# Patient Record
Sex: MALE | Race: OTHER | NOT HISPANIC OR LATINO | URBAN - METROPOLITAN AREA
[De-identification: names, ages, dates, MRNs, and addresses within clinical notes are randomized per-mention and may not be internally consistent; named-entity substitution may affect disease eponyms.]

---

## 2020-12-30 ENCOUNTER — EMERGENCY (EMERGENCY)
Facility: HOSPITAL | Age: 80
LOS: 1 days | Discharge: HOME | End: 2020-12-30
Attending: EMERGENCY MEDICINE
Payer: MEDICARE

## 2020-12-30 VITALS
DIASTOLIC BLOOD PRESSURE: 92 MMHG | SYSTOLIC BLOOD PRESSURE: 164 MMHG | OXYGEN SATURATION: 98 % | RESPIRATION RATE: 17 BRPM | HEART RATE: 62 BPM | TEMPERATURE: 98 F

## 2020-12-30 VITALS
RESPIRATION RATE: 16 BRPM | HEIGHT: 69 IN | TEMPERATURE: 98 F | HEART RATE: 59 BPM | DIASTOLIC BLOOD PRESSURE: 107 MMHG | WEIGHT: 220.46 LBS | OXYGEN SATURATION: 96 % | SYSTOLIC BLOOD PRESSURE: 214 MMHG

## 2020-12-30 DIAGNOSIS — Z98.89 OTHER SPECIFIED POSTPROCEDURAL STATES: Chronic | ICD-10-CM

## 2020-12-30 LAB
ALBUMIN SERPL ELPH-MCNC: 4.4 G/DL — SIGNIFICANT CHANGE UP (ref 3.3–5)
ALP SERPL-CCNC: 57 U/L — SIGNIFICANT CHANGE UP (ref 40–120)
ALT FLD-CCNC: 14 U/L — SIGNIFICANT CHANGE UP (ref 10–45)
ANION GAP SERPL CALC-SCNC: 11 MMOL/L — SIGNIFICANT CHANGE UP (ref 5–17)
APPEARANCE UR: CLEAR — SIGNIFICANT CHANGE UP
AST SERPL-CCNC: 18 U/L — SIGNIFICANT CHANGE UP (ref 10–40)
BACTERIA # UR AUTO: NEGATIVE — SIGNIFICANT CHANGE UP
BASOPHILS # BLD AUTO: 0.06 K/UL — SIGNIFICANT CHANGE UP (ref 0–0.2)
BASOPHILS NFR BLD AUTO: 0.8 % — SIGNIFICANT CHANGE UP (ref 0–2)
BILIRUB SERPL-MCNC: 0.6 MG/DL — SIGNIFICANT CHANGE UP (ref 0.2–1.2)
BILIRUB UR-MCNC: NEGATIVE — SIGNIFICANT CHANGE UP
BUN SERPL-MCNC: 19 MG/DL — SIGNIFICANT CHANGE UP (ref 7–23)
CALCIUM SERPL-MCNC: 9.8 MG/DL — SIGNIFICANT CHANGE UP (ref 8.4–10.5)
CHLORIDE SERPL-SCNC: 103 MMOL/L — SIGNIFICANT CHANGE UP (ref 96–108)
CO2 SERPL-SCNC: 25 MMOL/L — SIGNIFICANT CHANGE UP (ref 22–31)
COLOR SPEC: SIGNIFICANT CHANGE UP
CREAT SERPL-MCNC: 1.08 MG/DL — SIGNIFICANT CHANGE UP (ref 0.5–1.3)
DIFF PNL FLD: NEGATIVE — SIGNIFICANT CHANGE UP
EOSINOPHIL # BLD AUTO: 0.15 K/UL — SIGNIFICANT CHANGE UP (ref 0–0.5)
EOSINOPHIL NFR BLD AUTO: 2.1 % — SIGNIFICANT CHANGE UP (ref 0–6)
EPI CELLS # UR: 0 /HPF — SIGNIFICANT CHANGE UP
GLUCOSE SERPL-MCNC: 110 MG/DL — HIGH (ref 70–99)
GLUCOSE UR QL: NEGATIVE — SIGNIFICANT CHANGE UP
HCT VFR BLD CALC: 44.5 % — SIGNIFICANT CHANGE UP (ref 39–50)
HGB BLD-MCNC: 15.5 G/DL — SIGNIFICANT CHANGE UP (ref 13–17)
HYALINE CASTS # UR AUTO: 2 /LPF — SIGNIFICANT CHANGE UP (ref 0–2)
IMM GRANULOCYTES NFR BLD AUTO: 0.7 % — SIGNIFICANT CHANGE UP (ref 0–1.5)
KETONES UR-MCNC: SIGNIFICANT CHANGE UP
LEUKOCYTE ESTERASE UR-ACNC: NEGATIVE — SIGNIFICANT CHANGE UP
LYMPHOCYTES # BLD AUTO: 1.61 K/UL — SIGNIFICANT CHANGE UP (ref 1–3.3)
LYMPHOCYTES # BLD AUTO: 22.1 % — SIGNIFICANT CHANGE UP (ref 13–44)
MCHC RBC-ENTMCNC: 31 PG — SIGNIFICANT CHANGE UP (ref 27–34)
MCHC RBC-ENTMCNC: 34.8 GM/DL — SIGNIFICANT CHANGE UP (ref 32–36)
MCV RBC AUTO: 89 FL — SIGNIFICANT CHANGE UP (ref 80–100)
MONOCYTES # BLD AUTO: 0.4 K/UL — SIGNIFICANT CHANGE UP (ref 0–0.9)
MONOCYTES NFR BLD AUTO: 5.5 % — SIGNIFICANT CHANGE UP (ref 2–14)
NEUTROPHILS # BLD AUTO: 5.01 K/UL — SIGNIFICANT CHANGE UP (ref 1.8–7.4)
NEUTROPHILS NFR BLD AUTO: 68.8 % — SIGNIFICANT CHANGE UP (ref 43–77)
NITRITE UR-MCNC: NEGATIVE — SIGNIFICANT CHANGE UP
NRBC # BLD: 0 /100 WBCS — SIGNIFICANT CHANGE UP (ref 0–0)
PH UR: 6 — SIGNIFICANT CHANGE UP (ref 5–8)
PLATELET # BLD AUTO: 171 K/UL — SIGNIFICANT CHANGE UP (ref 150–400)
POTASSIUM SERPL-MCNC: 4.3 MMOL/L — SIGNIFICANT CHANGE UP (ref 3.5–5.3)
POTASSIUM SERPL-SCNC: 4.3 MMOL/L — SIGNIFICANT CHANGE UP (ref 3.5–5.3)
PROT SERPL-MCNC: 7.4 G/DL — SIGNIFICANT CHANGE UP (ref 6–8.3)
PROT UR-MCNC: ABNORMAL
RBC # BLD: 5 M/UL — SIGNIFICANT CHANGE UP (ref 4.2–5.8)
RBC # FLD: 12.1 % — SIGNIFICANT CHANGE UP (ref 10.3–14.5)
RBC CASTS # UR COMP ASSIST: 2 /HPF — SIGNIFICANT CHANGE UP (ref 0–4)
SARS-COV-2 RNA SPEC QL NAA+PROBE: SIGNIFICANT CHANGE UP
SODIUM SERPL-SCNC: 139 MMOL/L — SIGNIFICANT CHANGE UP (ref 135–145)
SP GR SPEC: 1.02 — SIGNIFICANT CHANGE UP (ref 1.01–1.02)
UROBILINOGEN FLD QL: NEGATIVE — SIGNIFICANT CHANGE UP
WBC # BLD: 7.28 K/UL — SIGNIFICANT CHANGE UP (ref 3.8–10.5)
WBC # FLD AUTO: 7.28 K/UL — SIGNIFICANT CHANGE UP (ref 3.8–10.5)
WBC UR QL: 2 /HPF — SIGNIFICANT CHANGE UP (ref 0–5)

## 2020-12-30 PROCEDURE — 85025 COMPLETE CBC W/AUTO DIFF WBC: CPT

## 2020-12-30 PROCEDURE — 96375 TX/PRO/DX INJ NEW DRUG ADDON: CPT

## 2020-12-30 PROCEDURE — 74176 CT ABD & PELVIS W/O CONTRAST: CPT | Mod: 26

## 2020-12-30 PROCEDURE — 87086 URINE CULTURE/COLONY COUNT: CPT

## 2020-12-30 PROCEDURE — 93010 ELECTROCARDIOGRAM REPORT: CPT

## 2020-12-30 PROCEDURE — 81001 URINALYSIS AUTO W/SCOPE: CPT

## 2020-12-30 PROCEDURE — 80053 COMPREHEN METABOLIC PANEL: CPT

## 2020-12-30 PROCEDURE — 74176 CT ABD & PELVIS W/O CONTRAST: CPT

## 2020-12-30 PROCEDURE — 96374 THER/PROPH/DIAG INJ IV PUSH: CPT

## 2020-12-30 PROCEDURE — 99285 EMERGENCY DEPT VISIT HI MDM: CPT

## 2020-12-30 PROCEDURE — 99284 EMERGENCY DEPT VISIT MOD MDM: CPT | Mod: 25

## 2020-12-30 PROCEDURE — 87635 SARS-COV-2 COVID-19 AMP PRB: CPT

## 2020-12-30 PROCEDURE — 93005 ELECTROCARDIOGRAM TRACING: CPT

## 2020-12-30 RX ORDER — SODIUM CHLORIDE 9 MG/ML
1000 INJECTION, SOLUTION INTRAVENOUS ONCE
Refills: 0 | Status: COMPLETED | OUTPATIENT
Start: 2020-12-30 | End: 2020-12-30

## 2020-12-30 RX ORDER — KETOROLAC TROMETHAMINE 30 MG/ML
30 SYRINGE (ML) INJECTION ONCE
Refills: 0 | Status: DISCONTINUED | OUTPATIENT
Start: 2020-12-30 | End: 2020-12-30

## 2020-12-30 RX ORDER — MORPHINE SULFATE 50 MG/1
4 CAPSULE, EXTENDED RELEASE ORAL ONCE
Refills: 0 | Status: DISCONTINUED | OUTPATIENT
Start: 2020-12-30 | End: 2020-12-30

## 2020-12-30 RX ADMIN — Medication 30 MILLIGRAM(S): at 16:52

## 2020-12-30 RX ADMIN — Medication 30 MILLIGRAM(S): at 18:01

## 2020-12-30 RX ADMIN — SODIUM CHLORIDE 1000 MILLILITER(S): 9 INJECTION, SOLUTION INTRAVENOUS at 16:52

## 2020-12-30 RX ADMIN — MORPHINE SULFATE 4 MILLIGRAM(S): 50 CAPSULE, EXTENDED RELEASE ORAL at 18:01

## 2020-12-30 RX ADMIN — MORPHINE SULFATE 4 MILLIGRAM(S): 50 CAPSULE, EXTENDED RELEASE ORAL at 16:47

## 2020-12-30 NOTE — ED ADULT NURSE REASSESSMENT NOTE - NS ED NURSE REASSESS COMMENT FT1
Patient reports improvement in pain, as 0/10 currently. Safety measures maintained. Bed in the lowest position. Call bell within reach. No acute distress noted or further complaints at this time. Awaiting CT.

## 2020-12-30 NOTE — ED PROVIDER NOTE - PATIENT PORTAL LINK FT
You can access the FollowMyHealth Patient Portal offered by [facility] by registering at the following website: [email or web address]. By joining [employer]’s FollowMyHealth portal, you will also be able to view your health information using other applications (apps) compatible with our system.

## 2020-12-30 NOTE — ED PROVIDER NOTE - NSFOLLOWUPINSTRUCTIONS_ED_ALL_ED_FT
KIDNEY STONE OVERVIEW  Kidney stones (also called nephrolithiasis or urolithiasis) are common, affecting 19 percent of men and 9 percent of women by age 70 years. Fortunately, treatment is available to effectively manage most stones. In addition, you can take steps to prevent kidney stones from recurring.    HOW KIDNEY STONES DEVELOP  A kidney stone can form when high levels of certain substances (calcium, oxalate, cystine, or uric acid) are present in the urine. Stones can also form when these substances are at normal levels, especially if you are not making a lot of urine (eg, not drinking enough fluids). The substances form tiny crystals, which become anchored in the kidney and gradually increase in size, forming a kidney stone.    Typically, the stone will move through the urinary tract and is passed out of the body in the urine. A stone may cause pain if it becomes stuck and blocks the flow of urine. Large stones do not always pass on their own and sometimes require a minimally invasive procedure to remove them.    KIDNEY STONE RISK FACTORS  Certain diseases, dietary habits, or medications can increase your risk of developing kidney stones; these are listed in the table. Once you have had a kidney stone, you are at an increased risk of getting another one in the future. A family history of kidney stones may also increase your risk.    KIDNEY STONE SYMPTOMS  Sometimes, a kidney stone does not cause any symptoms and is only found when imaging tests are done for another reason. Stones can remain in the kidneys for years without ever causing symptoms. However, stones typically do cause symptoms when they pass from the kidneys through the urinary tract.    Pain — Pain is the most common symptom when passing a kidney stone. Most often, pain only occurs with obstruction, which is when a stone blocks or impedes the passage of urine from the kidney to the bladder (figure 1). Pain can range from a mild ache to discomfort that is so intense it requires treatment in the hospital. Typically, the pain fluctuates in severity but does not go away completely without treatment. Waves of severe pain, known as renal colic, usually last 20 to 60 minutes. Pain can occur in the flank (the side, between the ribs and the hip) or the lower abdomen, and the pain can move toward the groin.    If you have pain that you suspect may be due to a kidney stone, call your health care provider for advice. They can do an examination, order tests, and recommend treatment if needed. If your pain is severe and you are not able to contact your provider, go to the emergency department for evaluation.    Blood in the urine — Most people with kidney stones will have blood in the urine; the medical term for this is "hematuria." The urine may appear pink or reddish, or the blood may not be visible until a urine sample is examined under a microscope. If you notice blood in your urine, let your health care provider know; they can order tests to figure out if it is caused by a kidney stone or something else. (See "Patient education: Blood in the urine (hematuria) in adults (Beyond the Basics)".)    Gravel — You may pass "gravel" or "sand," which are multiple small stones in your urine.    Other symptoms — Other kidney stone symptoms may include nausea or vomiting, pain with urination, and an urgent need to urinate.    KIDNEY STONE DIAGNOSIS  Kidney stones are usually diagnosed based upon your symptoms, a physical examination, and imaging tests.    Computed tomography (CT) scan — A CT scan creates a three-dimensional image of structures within the body. A particular type of CT scan (called non-contrast CT) is often recommended if kidney stones are suspected because it is the best way to see a stone. Plain X-rays cannot reliably detect kidney stones in all situations.    While "low-dose" CT scans involve less radiation than traditional CT scans, doctors still try to limit the number of CT scans a person has (in order to minimize radiation exposure).    Ultrasound — An ultrasound (or sonogram) is another type of imaging test. It can also be used to detect kidney stones, although small stones or stones in the ureters (the tubes that connect the kidney to the bladder) may be missed. Ultrasound is the preferred diagnostic procedure for people who should avoid radiation exposure, including pregnant women and children.    KIDNEY STONE TREATMENT  Treatment of a kidney stone that is causing obstruction depends upon the size and location of the stone, as well as your pain level and ability to keep down fluids. If your stone is small enough to be likely to pass, your pain is tolerable, and you are able to eat and drink, your health care provider will likely suggest treatment at home.    If you have severe pain or nausea, you will need to be treated with stronger pain medications and intravenous (IV) fluids, which are often given in the hospital. If you have a fever, you will also need treatment in the hospital as soon as possible, as this could indicate a potentially serious infection.    Home treatment — Managing a kidney stone at home involves treating your pain as needed, taking medication to help the stone pass more quickly (if your health care provider recommends this), and straining your urine so that the stone can be saved for testing once it does pass.    Pain relief — You can take non-prescription pain medication until the stone passes. This includes over-the-counter nonsteroidal antiinflammatory drugs (NSAIDs) such as ibuprofen (sample brand names: Advil, Motrin) or naproxen (sample brand names: Aleve, Naprosyn), but it is important to check with your doctor first. People with certain health conditions should not take NSAIDs.    Facilitating stone passage — There are several different medications that can help speed the passage of kidney stones, such as tamsulosin (brand name: Flomax). Depending on your situation and the size of your stone, your provider may recommend taking one of these medications for several weeks, until the stone passes.    Straining your urine — You will probably be asked to strain your urine to recover the stone. After you retrieve it, you should bring it to your health care provider so it can be analyzed in a laboratory. Knowing what type of kidney stone you have is important in planning treatments to prevent future stones. (See 'Preventing future kidney stones' below.)    If the stone does not pass — Stones larger than 9 or 10 millimeters rarely pass on their own and generally require a procedure to break up or remove the stone. Some smaller stones also do not pass. Several procedures are available if your stone does not pass on its own; your health care provider will likely refer you to a specialist called a "urologist" who can discuss your options with you and recommend the best approach for your situation.    Asymptomatic stones — If you have a kidney stone that was identified on an imaging test but is causing no symptoms, you may or may not need to have it removed right away. This decision is based upon the size and location of your stone, as well as your ability to get treatment quickly if symptoms were to develop. If there is a chance that you would not be able to get treatment quickly (eg, if you travel frequently), your health care provider is more likely to advise that you have the stone removed.    PREVENTING FUTURE KIDNEY STONES  After you have had a kidney stone, you are more likely to have another one in the future. Your health care provider will evaluate whether you may have certain health problems that increase your risk of kidney stones (table 1). This may include:    ?Analysis of passed stones – If you have passed and saved one or more stones (see 'Straining your urine' above), they should be analyzed to determine the composition (eg, calcium oxalate, uric acid, etc).    ?Urine tests – Your provider may request that you perform a 24-hour urine collection; this involves saving all the urine you produce over a 24-hour period. (See "Patient education: Collection of a 24-hour urine specimen (Beyond the Basics)".)    ?Other tests – Your provider may also recommend additional tests (eg, blood or imaging tests) if an underlying condition is suspected.    Depending on what your provider thinks may have caused your kidney stone, they may suggest doing one or more of the following to lower your risk of having another stone in the future:    ?Increasing fluid intake – Drinking more fluids can help lower your risk of kidney stones. The goal is to increase the amount of urine that flows through your kidneys and also to lower the concentrations of substances that promote stone formation. While you can vary the types of beverages you drink, sugar-sweetened beverages (such as soda and sports drinks) actually seem to increase the risk of kidney stones; they have other negative health effects as well.    ?Changing your diet – You may be advised to make changes in your diet; this will depend upon the type of kidney stone you have and results of your 24-hour urine collection tests.    ?Preventive medication – You may be advised to take a medication to reduce the risk of future stones.    *************************************************************************************************************************  THINGS TO WATCH OUT FOR:   -Signs of infection: fever, painful/burning urination, persistent vomiting  -Signs of worsening obstruction: inability to urinate, severe pain in flank not helped by pain medication.  *************************************************************************************************************************

## 2020-12-30 NOTE — ED PROVIDER NOTE - ATTENDING CONTRIBUTION TO CARE
PMD Juan Diego Snowden  80y male pmh Covid 19, renal colic (multiple) SP Litho, PUD,HTN, osteoarthritis, Patient comes to ed c/o left flank pain past 5d worsening overnight. No fever chiils, cough,sob,nvdc,hematuria, Pain nonradiating mod severe, no relief with motrin, No ppt/alleivating factors  PE WDWN male looking stated age mod discomfort from left flank pain. Heent ncat neck supple chest clear cv no rgm abd soft +bs no mass guarding, neuro no focal defects  Hardy Lopez MD, Facep

## 2020-12-30 NOTE — ED PROVIDER NOTE - PHYSICAL EXAMINATION
On Physical Exam:  General: well appearing, in NAD, speaking clearly in full sentences and without difficulty; cooperative with exam  HEENT: PERRL, MMM  Neck: no neck tenderness, no nuchal rigidity  Cardiac: normal s1, s2; RRR; no MGR  Lungs: CTABL  Abdomen: soft nontender/nondistended, No CVA tenderness  : no bladder tenderness or distension  Skin: warm, intact, no rash  Extremities: no peripheral edema, no gross deformities  Neuro: no gross neurologic deficits

## 2020-12-30 NOTE — ED PROVIDER NOTE - OBJECTIVE STATEMENT
80 year old male with pmhx of nephrolithiasis, HTN, OA presents to the ED with 4 days of worsening L flank pain. patient reports pain seems similar to his episodes of kidney stones in the past. patient reports taking motrin last night with no relief. patient denies any traumas, falls, injuries, syncopes. patient reports being able to urniate and denies hematuria. patient denies chest pain, Shortness of Breath, abdominal pain, Nausea/Vomiting/Diarrhea, dizziness, weakness, confusion, vision changes, urinary symptoms, syncope, falls, trauma, discharge, bleeding, fevers

## 2020-12-30 NOTE — ED ADULT NURSE NOTE - OBJECTIVE STATEMENT
81 y/o male coming to the ER with c/o right lower back pain. A&Ox3. Ambulatory. PMH HTN, kidney stones. Patient reports his right lower back pain is 10/10 and began several days ago. Patient says it feels similar to a kidney stone. Abdomen is soft, non distended, non tender. Negative for CVA tenderness. Patient is hypertensive at 210/98 on the monitor. Patient denies chest pain, fever, chills, n/v/d, urinary symptoms, abdominal pain. Safety measures maintained. Bed in the lowest position. Call bell within reach.  MD at the bedside. No acute distress noted or further complaints at this time.

## 2020-12-30 NOTE — ED PROVIDER NOTE - CLINICAL SUMMARY MEDICAL DECISION MAKING FREE TEXT BOX
Elderly male w hx of renal stones pw c/o left flank pain mod severe cw prior colic, concerns for recurrence. Check ct, labs ua, pain control ivf and reassess  Hardy Lopez MD, Facep Elderly male w hx of renal stones pw c/o left flank pain mod severe cw prior colic, concerns for recurrence. Check ct, labs ua, pain control ivf and reassess.  CT neg for ureteral stone? stone in bladder passed?, Pt pain free, No hx of trauma, fever chills, rash(zoster), Pt hip full rom. will dc opt follow up.  Hardy Lopez MD, Facep

## 2020-12-30 NOTE — ED PROVIDER NOTE - NSFOLLOWUPCLINICS_GEN_ALL_ED_FT
[facility]  Urology  [address] [place] [address] - [address] [place] [address]  Phone: [phone]  Fax:   Follow Up Time:

## 2020-12-30 NOTE — ED PROVIDER NOTE - PROGRESS NOTE DETAILS
patient reassessed. in no distress reports feeling a lot better. urology consulted for eval. patient denies any pain att Andrea Mederos PA-C

## 2020-12-31 LAB
CULTURE RESULTS: SIGNIFICANT CHANGE UP
SPECIMEN SOURCE: SIGNIFICANT CHANGE UP

## 2021-01-01 NOTE — ED POST DISCHARGE NOTE - DETAILS
Patient called back.  Reports that he is still having some flank pain, but it improves with motrin and/or percocet.  otherwise feeling well.  Denies fevers, nausea/vomiting.  Patient states that he contacted urology clinic, but they are closed until monday.  Patient also provided with information to [facility] for urology to help expedite follow up. Instructed to return to the ER for fevers/chills, worsening pain, nausea/vomiting or any other concerning symptoms.  -Ryan Doshi PA-C

## 2021-01-04 ENCOUNTER — EMERGENCY (EMERGENCY)
Facility: HOSPITAL | Age: 81
LOS: 1 days | Discharge: HOME | End: 2021-01-04
Attending: STUDENT IN AN ORGANIZED HEALTH CARE EDUCATION/TRAINING PROGRAM
Payer: MEDICARE

## 2021-01-04 VITALS
WEIGHT: 220.46 LBS | DIASTOLIC BLOOD PRESSURE: 84 MMHG | HEIGHT: 69 IN | HEART RATE: 58 BPM | RESPIRATION RATE: 16 BRPM | OXYGEN SATURATION: 97 % | SYSTOLIC BLOOD PRESSURE: 133 MMHG | TEMPERATURE: 98 F

## 2021-01-04 DIAGNOSIS — Z98.89 OTHER SPECIFIED POSTPROCEDURAL STATES: Chronic | ICD-10-CM

## 2021-01-04 PROBLEM — U07.1 COVID-19: Chronic | Status: ACTIVE | Noted: 2020-12-30

## 2021-01-04 LAB
ALBUMIN SERPL ELPH-MCNC: 4 G/DL — SIGNIFICANT CHANGE UP (ref 3.3–5)
ALP SERPL-CCNC: 52 U/L — SIGNIFICANT CHANGE UP (ref 40–120)
ALT FLD-CCNC: 11 U/L — SIGNIFICANT CHANGE UP (ref 10–45)
ANION GAP SERPL CALC-SCNC: 13 MMOL/L — SIGNIFICANT CHANGE UP (ref 5–17)
APPEARANCE UR: CLEAR — SIGNIFICANT CHANGE UP
AST SERPL-CCNC: 15 U/L — SIGNIFICANT CHANGE UP (ref 10–40)
BACTERIA # UR AUTO: NEGATIVE — SIGNIFICANT CHANGE UP
BASOPHILS # BLD AUTO: 0.05 K/UL — SIGNIFICANT CHANGE UP (ref 0–0.2)
BASOPHILS NFR BLD AUTO: 0.8 % — SIGNIFICANT CHANGE UP (ref 0–2)
BILIRUB SERPL-MCNC: 0.4 MG/DL — SIGNIFICANT CHANGE UP (ref 0.2–1.2)
BILIRUB UR-MCNC: NEGATIVE — SIGNIFICANT CHANGE UP
BUN SERPL-MCNC: 21 MG/DL — SIGNIFICANT CHANGE UP (ref 7–23)
CALCIUM SERPL-MCNC: 9.6 MG/DL — SIGNIFICANT CHANGE UP (ref 8.4–10.5)
CHLORIDE SERPL-SCNC: 105 MMOL/L — SIGNIFICANT CHANGE UP (ref 96–108)
CO2 SERPL-SCNC: 23 MMOL/L — SIGNIFICANT CHANGE UP (ref 22–31)
COLOR SPEC: YELLOW — SIGNIFICANT CHANGE UP
CREAT SERPL-MCNC: 1.32 MG/DL — HIGH (ref 0.5–1.3)
DIFF PNL FLD: NEGATIVE — SIGNIFICANT CHANGE UP
EOSINOPHIL # BLD AUTO: 0.3 K/UL — SIGNIFICANT CHANGE UP (ref 0–0.5)
EOSINOPHIL NFR BLD AUTO: 4.9 % — SIGNIFICANT CHANGE UP (ref 0–6)
EPI CELLS # UR: 1 /HPF — SIGNIFICANT CHANGE UP
GLUCOSE SERPL-MCNC: 88 MG/DL — SIGNIFICANT CHANGE UP (ref 70–99)
GLUCOSE UR QL: NEGATIVE — SIGNIFICANT CHANGE UP
HCT VFR BLD CALC: 42.2 % — SIGNIFICANT CHANGE UP (ref 39–50)
HGB BLD-MCNC: 14.5 G/DL — SIGNIFICANT CHANGE UP (ref 13–17)
HYALINE CASTS # UR AUTO: 15 /LPF — HIGH (ref 0–2)
IMM GRANULOCYTES NFR BLD AUTO: 0.2 % — SIGNIFICANT CHANGE UP (ref 0–1.5)
KETONES UR-MCNC: NEGATIVE — SIGNIFICANT CHANGE UP
LEUKOCYTE ESTERASE UR-ACNC: NEGATIVE — SIGNIFICANT CHANGE UP
LYMPHOCYTES # BLD AUTO: 1.87 K/UL — SIGNIFICANT CHANGE UP (ref 1–3.3)
LYMPHOCYTES # BLD AUTO: 30.5 % — SIGNIFICANT CHANGE UP (ref 13–44)
MCHC RBC-ENTMCNC: 30.9 PG — SIGNIFICANT CHANGE UP (ref 27–34)
MCHC RBC-ENTMCNC: 34.4 GM/DL — SIGNIFICANT CHANGE UP (ref 32–36)
MCV RBC AUTO: 90 FL — SIGNIFICANT CHANGE UP (ref 80–100)
MONOCYTES # BLD AUTO: 0.45 K/UL — SIGNIFICANT CHANGE UP (ref 0–0.9)
MONOCYTES NFR BLD AUTO: 7.3 % — SIGNIFICANT CHANGE UP (ref 2–14)
NEUTROPHILS # BLD AUTO: 3.46 K/UL — SIGNIFICANT CHANGE UP (ref 1.8–7.4)
NEUTROPHILS NFR BLD AUTO: 56.3 % — SIGNIFICANT CHANGE UP (ref 43–77)
NITRITE UR-MCNC: NEGATIVE — SIGNIFICANT CHANGE UP
NRBC # BLD: 0 /100 WBCS — SIGNIFICANT CHANGE UP (ref 0–0)
PH UR: 5.5 — SIGNIFICANT CHANGE UP (ref 5–8)
PLATELET # BLD AUTO: 168 K/UL — SIGNIFICANT CHANGE UP (ref 150–400)
POTASSIUM SERPL-MCNC: 4.5 MMOL/L — SIGNIFICANT CHANGE UP (ref 3.5–5.3)
POTASSIUM SERPL-SCNC: 4.5 MMOL/L — SIGNIFICANT CHANGE UP (ref 3.5–5.3)
PROT SERPL-MCNC: 7.1 G/DL — SIGNIFICANT CHANGE UP (ref 6–8.3)
PROT UR-MCNC: ABNORMAL
RBC # BLD: 4.69 M/UL — SIGNIFICANT CHANGE UP (ref 4.2–5.8)
RBC # FLD: 12.3 % — SIGNIFICANT CHANGE UP (ref 10.3–14.5)
RBC CASTS # UR COMP ASSIST: 3 /HPF — SIGNIFICANT CHANGE UP (ref 0–4)
SODIUM SERPL-SCNC: 141 MMOL/L — SIGNIFICANT CHANGE UP (ref 135–145)
SP GR SPEC: 1.03 — HIGH (ref 1.01–1.02)
UROBILINOGEN FLD QL: NEGATIVE — SIGNIFICANT CHANGE UP
WBC # BLD: 6.14 K/UL — SIGNIFICANT CHANGE UP (ref 3.8–10.5)
WBC # FLD AUTO: 6.14 K/UL — SIGNIFICANT CHANGE UP (ref 3.8–10.5)
WBC UR QL: 2 /HPF — SIGNIFICANT CHANGE UP (ref 0–5)

## 2021-01-04 PROCEDURE — 76770 US EXAM ABDO BACK WALL COMP: CPT | Mod: 26

## 2021-01-04 PROCEDURE — 99285 EMERGENCY DEPT VISIT HI MDM: CPT

## 2021-01-04 RX ORDER — ACETAMINOPHEN 500 MG
975 TABLET ORAL ONCE
Refills: 0 | Status: COMPLETED | OUTPATIENT
Start: 2021-01-04 | End: 2021-01-04

## 2021-01-04 RX ORDER — OXYCODONE AND ACETAMINOPHEN 5; 325 MG/1; MG/1
1 TABLET ORAL
Qty: 12 | Refills: 0
Start: 2021-01-04 | End: 2021-01-06

## 2021-01-04 RX ORDER — SODIUM CHLORIDE 9 MG/ML
2000 INJECTION, SOLUTION INTRAVENOUS ONCE
Refills: 0 | Status: COMPLETED | OUTPATIENT
Start: 2021-01-04 | End: 2021-01-04

## 2021-01-04 RX ADMIN — SODIUM CHLORIDE 2000 MILLILITER(S): 9 INJECTION, SOLUTION INTRAVENOUS at 21:28

## 2021-01-04 RX ADMIN — Medication 975 MILLIGRAM(S): at 16:43

## 2021-01-04 RX ADMIN — Medication 975 MILLIGRAM(S): at 21:23

## 2021-01-04 NOTE — ED PROVIDER NOTE - PROGRESS NOTE DETAILS
Marina Jaramillo MD pt signed out to me pending results of cmp, hx of kidney stone, s/p ambulation w/ worsening flank pain, no emesis will give pain meds Tristan: pts pain returned shortly before dc, given more medications, passed PO trial, feels well. HTN controlled with home med

## 2021-01-04 NOTE — ED PROVIDER NOTE - PATIENT PORTAL LINK FT
You can access the FollowMyHealth Patient Portal offered by [facility] by registering at the following website: [email or web address]. By joining [employer]’s FollowMyHealth portal, you will also be able to view your health information using other applications (apps) compatible with our system. You can access the FollowMyHealth Patient Portal offered by [facility] by registering at the following website: [email or web address]. By joining [employer]’s FollowMyHealth portal, you will also be able to view your health information using other applications (apps) compatible with our system.

## 2021-01-04 NOTE — ED PROVIDER NOTE - PHYSICAL EXAMINATION
Vitals: I have reviewed the patients vital signs  General: Well dressed, well appearing, no acute distress  HEENT: Atraumatic, normocephalic, airway patent  Eyes: EOMI, tracking appropriately  Neck: no tracheal deviation, no JVD  Chest/Lungs: no trauma, symmetric chest rise, speaking in complete sentences  Abd: SNT, no rebound no guarding  Back: no cvat  Heart: skin and extremities well perfused, regular rate and rhythm  Neuro: A+Ox3, ambulating without difficulty, CN grossly intact  MSK: strength at baseline in all extremities, no muscle wasting or atrophy  Skin: no cyanosis, no jaundice, no new emergent lesions

## 2021-01-04 NOTE — ED PROVIDER NOTE - RAPID ASSESSMENT
80 y.o M presents with 5-6 days of left flank pain. Pt has known kidney stone dx 3 weeks ago. Reports pain is persistent a/w dysuria. Did not endorse any pain medication PTA. Denies hematuria.     Scribe Statement: ISudhakar Tiffany, attest that this documentation has been prepared under the direction and in the presence of Juan Diego Hay (MD)     Juan Diego Hay (MD) note: The scribe (Venus De La Fuente)'s documentation has been prepared under my direction and personally reviewed by me.  Patient was seen as a tele QDOC patient, a thorough physical exam was not performed. The patient will be seen and further worked up in the main emergency department and their care will be completed by the main emergency department team. Receiving team will follow up on labs, analgesia, any clinical imaging, reassess and disposition as clinically indicated, all decisions regarding the progression of care will be made at their discretion. 80 y.o M presents with 5-6 days of left flank pain. Pt has known kidney stone dx 3 weeks ago. Reports pain is persistent a/w dysuria. Did not endorse any pain medication PTA. Denies hematuria.     Scribe Statement: I, Otis De La Fuentey, attest that this documentation has been prepared under the direction and in the presence of Juan Diego Hay)     Juan Diego Hay (MD) note: The scribe (Venus De La Fuente)'s documentation has been prepared under my direction and personally reviewed by me.  Patient was seen as a tele QDOC patient, a thorough physical exam was not performed. The patient will be seen and further worked up in the main emergency department and their care will be completed by the main emergency department team. Receiving team will follow up on labs, analgesia, any clinical imaging, reassess and disposition as clinically indicated, all decisions regarding the progression of care will be made at their discretion.    Bharti KIMBROUGH: Patient was rapidly assessed via a telemedicine and/or role of Quick Triage Doctor; a limited history, physical exam and assessment was performed. The patient will be seen and further evaluated in the main emergency department. The remainder of care and evaluation will be conducted by the primary emergency medicine team. Receiving team will follow up on labs, imaging and serially reassess patient as indicated. All further decisions regarding patient care, evaluation and disposition are at the discretion of the receiving primary emergency department team.  Bharti KIMBROUGH: I personally performed the service described in the documentation recorded by the scribe in my presence, and it accurately and completely records my words and actions.

## 2021-01-04 NOTE — ED PROVIDER NOTE - NS ED ROS FT
Constitutional: (-) fever (-) vomiting  Eyes/ENT: (-) vision changes, (-) hearing chnages  Cardiovascular: (-) chest pain, (-) wheezing  Respiratory: (-) cough, (-) shortness of breath  Gastrointestinal: (-) diarrhea, (-) abdominal pain  : (-) dysuria   Musculoskeletal: (+) back pain  Integumentary: (-) rash, (-) edema  Neurological: (-)loc  Allergic/Immunologic: (-) pruritus  Endocrine: No history of thyroid disease

## 2021-01-04 NOTE — ED PROVIDER NOTE - CLINICAL SUMMARY MEDICAL DECISION MAKING FREE TEXT BOX
81 y/o M hx nephrolithaisis here with continuation of pain from stone visualized on CT recently, out of prescribed pain killers. Pain worst when moving. Similar to previous. No urinary sx. Has been to Uro in past but doesn't remember who it is. Afebrile, able to urinate without difficulty. One episode of emesis due to pain, otherwise well. Exam nonfocal no CVAT, abd snt. Will get US to eval for hydro, check Cr, hydrate, pain control, dc with uro f/u

## 2021-01-04 NOTE — ED PROVIDER NOTE - ATTENDING CONTRIBUTION TO CARE
I performed a history and physical exam of the patient and discussed their management with the resident. I reviewed the resident's note and agree with the documented findings and plan of care. My medical decision making and observations are found above.    80M hx nephrolithiasis p/w L flank pain c/w prior episodes of kidney stones. States, "I have been having kidney stones for 40 years, I know what this feels like." Ran out of oxycodone at home for breakthrough pain. Was unable to schedule appointment with urologist today so came to ER. HAd recent CT abd last week that showed L renal stone and no other pathology. Patient states he is pain free in ER post tylenol. Nontoxic appearing, nad. cv rrr no m/r/g, lungs ctabl, 2+ pulses in distal extremities b/l. mild L cva ttp. abd soft, ntnd, no pulsatile masses. likely related to kidney stone given strong hx. Very low suspicion for aortic pathology given recent imaging and h&p. Will US to assess for hydro, pain control, reassess. IF to be d/c'd, will attempted to facilitate URo f/u.

## 2021-01-04 NOTE — ED PROVIDER NOTE - NSFOLLOWUPCLINICS_GEN_ALL_ED_FT
[facility]  Urology  [address] [place] [address] [address]  [place] [address]  Phone: [phone]  Fax:   Follow Up Time:     [facility]  Urology  [address] [place] [id number]  Phone: [phone]  Fax:   Follow Up Time:

## 2021-01-04 NOTE — ED PROVIDER NOTE - OBJECTIVE STATEMENT
81 y/o M hx of previous nephrolithaisis diagnosed recently here with continuation of same pain. Out of percocet. Symptoms unchanged. Able to urinate well. No fevers chills cp, sob, had one episode of emesis. Pain only when moving.

## 2021-01-04 NOTE — ED ADULT NURSE NOTE - OBJECTIVE STATEMENT
PT 80 year old male, A/O x3. PT came in through triage through wheelchair due to left flank pain. PMH- HTN, Kidney stones. PT states 6 days ago he had sudden onset of sharp left flank pain, occasionally radiates to LLQ. PT states he last had kidney stones 4 years ago and kidney stones passed on own. Denies hematuria, dysuria, fever, N/V/D, dizziness/ lightheadedness, numbness/ tingling. Skin- warm, dry, intact. Abd. soft, nontender, nondistended. Interventions- side rials up, call bell at bedside. IV- 20 top hand.

## 2021-01-04 NOTE — ED PROVIDER NOTE - NSFOLLOWUPINSTRUCTIONS_ED_ALL_ED_FT
Based on your evaluation (which may have included elements from your history, physical examination, laboratory testing including urinalysis, and ultrasound) it has been determined that you are very likely to have an uncomplicated kidney stone or other condition that will resolve without specific intervention. Most kidney stones are small and will pass spontaneously, and CT imaging (a “Cat scan”) was not felt to be needed during this visit.    You should strain your urine to confirm you passed the stone.  However, should your symptoms persist beyond 1-2 weeks you may be in the minority of patients with kidney stone who will require an intervention by a urologist.     If an ultrasound was obtained it may have shown some swelling (hydronephrosis or “hydro”), which is a normal finding with a symptomatic kidney stone. This should be followed up with your primary physician and a follow-up ultrasound may be obtained to ensure that it has resolved.    Additionally, while your current evaluation does not show any evidence of infection or other serious condition, should you develop signs of infection or other illness (particularly fever, chills, generalized weakness or feeling ill, worsening abdominal pain or tenderness, persistent vomiting) this may represent an infected stone or other intra-abdominal process and should be evaluated in the emergency department or other setting with access to CT imaging.

## 2021-01-05 ENCOUNTER — APPOINTMENT (OUTPATIENT)
Dept: UROLOGY | Facility: CLINIC | Age: 81
End: 2021-01-05
Payer: MEDICARE

## 2021-01-05 VITALS
SYSTOLIC BLOOD PRESSURE: 180 MMHG | HEART RATE: 45 BPM | OXYGEN SATURATION: 98 % | DIASTOLIC BLOOD PRESSURE: 81 MMHG | RESPIRATION RATE: 18 BRPM

## 2021-01-05 VITALS
WEIGHT: 200 LBS | DIASTOLIC BLOOD PRESSURE: 82 MMHG | HEIGHT: 68 IN | TEMPERATURE: 98.2 F | SYSTOLIC BLOOD PRESSURE: 140 MMHG | BODY MASS INDEX: 30.31 KG/M2

## 2021-01-05 DIAGNOSIS — N20.0 CALCULUS OF KIDNEY: ICD-10-CM

## 2021-01-05 LAB
ALBUMIN SERPL ELPH-MCNC: 0.4 G/DL — LOW (ref 3.3–5)
ALBUMIN SERPL ELPH-MCNC: 3.9 G/DL — SIGNIFICANT CHANGE UP (ref 3.3–5)
ALP SERPL-CCNC: 50 U/L — SIGNIFICANT CHANGE UP (ref 40–120)
ALP SERPL-CCNC: <15 U/L — LOW (ref 40–120)
ALT FLD-CCNC: 7 U/L — LOW (ref 10–45)
ALT FLD-CCNC: <5 U/L — LOW (ref 10–45)
ANION GAP SERPL CALC-SCNC: 12 MMOL/L — SIGNIFICANT CHANGE UP (ref 5–17)
ANION GAP SERPL CALC-SCNC: 24 MMOL/L — HIGH (ref 5–17)
AST SERPL-CCNC: 10 U/L — SIGNIFICANT CHANGE UP (ref 10–40)
AST SERPL-CCNC: <5 U/L — LOW (ref 10–40)
BILIRUB SERPL-MCNC: 0.6 MG/DL — SIGNIFICANT CHANGE UP (ref 0.2–1.2)
BILIRUB SERPL-MCNC: <0.1 MG/DL — LOW (ref 0.2–1.2)
BUN SERPL-MCNC: 18 MG/DL — SIGNIFICANT CHANGE UP (ref 7–23)
BUN SERPL-MCNC: <4 MG/DL — LOW (ref 7–23)
CALCIUM SERPL-MCNC: 5.8 MG/DL — CRITICAL LOW (ref 8.4–10.5)
CALCIUM SERPL-MCNC: 9.3 MG/DL — SIGNIFICANT CHANGE UP (ref 8.4–10.5)
CHLORIDE SERPL-SCNC: 101 MMOL/L — SIGNIFICANT CHANGE UP (ref 96–108)
CHLORIDE SERPL-SCNC: 106 MMOL/L — SIGNIFICANT CHANGE UP (ref 96–108)
CO2 SERPL-SCNC: 24 MMOL/L — SIGNIFICANT CHANGE UP (ref 22–31)
CO2 SERPL-SCNC: <10 MMOL/L — CRITICAL LOW (ref 22–31)
CREAT SERPL-MCNC: 1.15 MG/DL — SIGNIFICANT CHANGE UP (ref 0.5–1.3)
CREAT SERPL-MCNC: <0.3 MG/DL — LOW (ref 0.5–1.3)
GLUCOSE SERPL-MCNC: 16 MG/DL — CRITICAL LOW (ref 70–99)
GLUCOSE SERPL-MCNC: 98 MG/DL — SIGNIFICANT CHANGE UP (ref 70–99)
POTASSIUM SERPL-MCNC: 4 MMOL/L — SIGNIFICANT CHANGE UP (ref 3.5–5.3)
POTASSIUM SERPL-MCNC: 4 MMOL/L — SIGNIFICANT CHANGE UP (ref 3.5–5.3)
POTASSIUM SERPL-SCNC: 4 MMOL/L — SIGNIFICANT CHANGE UP (ref 3.5–5.3)
POTASSIUM SERPL-SCNC: 4 MMOL/L — SIGNIFICANT CHANGE UP (ref 3.5–5.3)
PROT SERPL-MCNC: 0.7 G/DL — LOW (ref 6–8.3)
PROT SERPL-MCNC: 6.7 G/DL — SIGNIFICANT CHANGE UP (ref 6–8.3)
SODIUM SERPL-SCNC: 129 MMOL/L — LOW (ref 135–145)
SODIUM SERPL-SCNC: 142 MMOL/L — SIGNIFICANT CHANGE UP (ref 135–145)

## 2021-01-05 PROCEDURE — 96361 HYDRATE IV INFUSION ADD-ON: CPT

## 2021-01-05 PROCEDURE — 87086 URINE CULTURE/COLONY COUNT: CPT

## 2021-01-05 PROCEDURE — 76770 US EXAM ABDO BACK WALL COMP: CPT

## 2021-01-05 PROCEDURE — 85025 COMPLETE CBC W/AUTO DIFF WBC: CPT

## 2021-01-05 PROCEDURE — 99072 ADDL SUPL MATRL&STAF TM PHE: CPT

## 2021-01-05 PROCEDURE — 80053 COMPREHEN METABOLIC PANEL: CPT

## 2021-01-05 PROCEDURE — 87186 SC STD MICRODIL/AGAR DIL: CPT

## 2021-01-05 PROCEDURE — 81001 URINALYSIS AUTO W/SCOPE: CPT

## 2021-01-05 PROCEDURE — 99284 EMERGENCY DEPT VISIT MOD MDM: CPT | Mod: 25

## 2021-01-05 PROCEDURE — 99204 OFFICE O/P NEW MOD 45 MIN: CPT

## 2021-01-05 PROCEDURE — 96360 HYDRATION IV INFUSION INIT: CPT

## 2021-01-05 RX ORDER — IBUPROFEN 200 MG
600 TABLET ORAL ONCE
Refills: 0 | Status: COMPLETED | OUTPATIENT
Start: 2021-01-05 | End: 2021-01-05

## 2021-01-05 RX ORDER — ACETAMINOPHEN 500 MG
650 TABLET ORAL ONCE
Refills: 0 | Status: COMPLETED | OUTPATIENT
Start: 2021-01-05 | End: 2021-01-05

## 2021-01-05 RX ORDER — OXYCODONE HYDROCHLORIDE 5 MG/1
5 TABLET ORAL ONCE
Refills: 0 | Status: DISCONTINUED | OUTPATIENT
Start: 2021-01-05 | End: 2021-01-05

## 2021-01-05 RX ORDER — METOPROLOL TARTRATE 50 MG
50 TABLET ORAL ONCE
Refills: 0 | Status: COMPLETED | OUTPATIENT
Start: 2021-01-05 | End: 2021-01-05

## 2021-01-05 RX ORDER — NAPROXEN 500 MG/1
500 TABLET ORAL
Qty: 30 | Refills: 0 | Status: ACTIVE | COMMUNITY
Start: 2021-01-05 | End: 1900-01-01

## 2021-01-05 RX ADMIN — SODIUM CHLORIDE 2000 MILLILITER(S): 9 INJECTION, SOLUTION INTRAVENOUS at 02:16

## 2021-01-05 RX ADMIN — Medication 50 MILLIGRAM(S): at 02:10

## 2021-01-05 RX ADMIN — Medication 600 MILLIGRAM(S): at 02:26

## 2021-01-05 RX ADMIN — OXYCODONE HYDROCHLORIDE 5 MILLIGRAM(S): 5 TABLET ORAL at 02:16

## 2021-01-05 RX ADMIN — Medication 650 MILLIGRAM(S): at 02:26

## 2021-01-05 RX ADMIN — OXYCODONE HYDROCHLORIDE 5 MILLIGRAM(S): 5 TABLET ORAL at 00:27

## 2021-01-05 NOTE — ASSESSMENT
[FreeTextEntry1] : images reviewed with pt \par nonobstructing stone not causing his pain \par will observe\par pain like musculoskeletal\par course of naprosyn\par

## 2021-01-05 NOTE — HISTORY OF PRESENT ILLNESS
[FreeTextEntry1] : cc right sided pain \par 79 yo male h/o kidney stones c/o right sided pain \par pain sharp worse with movement radiates down to his leg\par no voiding complaints\par ct 1.5 left nonobstructing stone stable since 2005 \par f/u sono no hydro

## 2021-01-05 NOTE — ED ADULT NURSE REASSESSMENT NOTE - NS ED NURSE REASSESS COMMENT FT1
PT provided ginger ale and crackers as asked by ED Resident Dwayne Hudson.
PT states pain was partially relieved after oxycodone.
PT states pain increased when ambulated to bathroom. ED MD Cher Jaramillo made aware.
PT states pain is tolerable when lying down and increases when ambulating.
PT about to be transported to [place]. PT well appearing and states he feels more comfortable after Tylenol.

## 2021-01-05 NOTE — REVIEW OF SYSTEMS
[denies] : denies pain with orgasm [Seen by urologist before (Name)  ___] : Preciously seen by a urologist: [unfilled] [Pain during urination] : pain during urination [History of kidney stones] : history of kidney stones [Negative] : Heme/Lymph [Bladder pressure] : denies bladder pressure

## 2021-01-06 NOTE — ED POST DISCHARGE NOTE - DETAILS
1/6/20- [facility] for 1522.  Naina 1/7/20: left VM for cb. -Rajani Knox PA-C 1/8/2021: Spoke with patient, reports he is feeling well, had f/u with his urologist since discharge and given muscle relaxer for pain, no antibiotics. Will rx course of keflex and advised to f/u with uro. - Thais Harrington PA-C

## 2021-01-08 RX ORDER — CEPHALEXIN 500 MG
1 CAPSULE ORAL
Qty: 14 | Refills: 0
Start: 2021-01-08 | End: 2021-01-14

## 2021-01-15 ENCOUNTER — EMERGENCY (EMERGENCY)
Facility: HOSPITAL | Age: 81
LOS: 1 days | Discharge: HOME | End: 2021-01-15
Attending: EMERGENCY MEDICINE
Payer: MEDICARE

## 2021-01-15 VITALS
HEART RATE: 86 BPM | DIASTOLIC BLOOD PRESSURE: 76 MMHG | RESPIRATION RATE: 19 BRPM | TEMPERATURE: 99 F | SYSTOLIC BLOOD PRESSURE: 152 MMHG | OXYGEN SATURATION: 95 %

## 2021-01-15 VITALS
DIASTOLIC BLOOD PRESSURE: 62 MMHG | OXYGEN SATURATION: 95 % | HEART RATE: 89 BPM | HEIGHT: 69 IN | WEIGHT: 210.1 LBS | TEMPERATURE: 99 F | RESPIRATION RATE: 16 BRPM | SYSTOLIC BLOOD PRESSURE: 162 MMHG

## 2021-01-15 DIAGNOSIS — Z98.89 OTHER SPECIFIED POSTPROCEDURAL STATES: Chronic | ICD-10-CM

## 2021-01-15 LAB — SARS-COV-2 RNA SPEC QL NAA+PROBE: DETECTED

## 2021-01-15 PROCEDURE — 99284 EMERGENCY DEPT VISIT MOD MDM: CPT

## 2021-01-15 PROCEDURE — 99283 EMERGENCY DEPT VISIT LOW MDM: CPT

## 2021-01-15 PROCEDURE — U0005: CPT

## 2021-01-15 PROCEDURE — U0003: CPT

## 2021-01-15 RX ORDER — PANTOPRAZOLE SODIUM 20 MG/1
1 TABLET, DELAYED RELEASE ORAL
Qty: 14 | Refills: 0
Start: 2021-01-15 | End: 2021-01-28

## 2021-01-15 RX ORDER — ACETAMINOPHEN 500 MG
1 TABLET ORAL
Qty: 42 | Refills: 0
Start: 2021-01-15 | End: 2021-01-28

## 2021-01-15 NOTE — ED PROVIDER NOTE - OBJECTIVE STATEMENT
Patient is an 81 y.o M w/ PMH HTN who presents w/ dry cough, malaise, and congestion that started about 2 days ago. He called his PCP who sent him for a COVID PCR test which resulted positive. He denies any fever/chills, SOB, COLE, or LE swelling. He admits to 3 episodes of watery diarrhea last night that has since resolved. He denies any recent antibiotic use. He admits to a poor appetite but has been tolerating PO (apple sauce, juice, broth). Denies N/V. Patient states that he lives alone which makes him concerned about his covid 19 diagnosis. He is requesting a medication to help clear his congestion.     Med:   metoprolol 50 BID  losartan 5 daily Patient is an 81 y.o M w/ PMH HTN who presents w/ dry cough, malaise, and congestion that started about 2 days ago. He called his PCP who sent him for a COVID PCR test which resulted positive. He denies any fever/chills, SOB, COLE, or LE swelling. He admits to 3 episodes of watery diarrhea last night that has since resolved. He denies any recent antibiotic use. He admits to a poor appetite but has been tolerating PO (apple sauce, juice, broth). Denies N/V. Patient states that he lives alone which makes him concerned about his covid 19 diagnosis. He is requesting a medication to help clear his congestion.     Med:   metoprolol 50 BID  losartan 25 daily

## 2021-01-15 NOTE — ED PROVIDER NOTE - PATIENT PORTAL LINK FT
You can access the FollowMyHealth Patient Portal offered by [facility] by registering at the following website: [email or web address]. By joining [employer]’s FollowMyHealth portal, you will also be able to view your health information using other applications (apps) compatible with our system.

## 2021-01-15 NOTE — ED PROVIDER NOTE - PLAN OF CARE
Patient presents w/ mild covid 19 symptoms that started 2 days ago. known + COVID 19 PCR. Does meet criteria for monoclonal ab infusion (obese, age 81) clinical improvement

## 2021-01-15 NOTE — ED PROVIDER NOTE - CARE PLAN
Principal Discharge DX:	COVID-19   Principal Discharge DX:	COVID-19  Goal:	clinical improvement  Assessment and plan of treatment:	Patient presents w/ mild covid 19 symptoms that started 2 days ago. known + COVID 19 PCR. Does meet criteria for monoclonal ab infusion (obese, age 81)

## 2021-01-15 NOTE — ED ADULT NURSE NOTE - OBJECTIVE STATEMENT
80 y/o male PMH kidney stones presents to ED reporting "burning" of throat, cough, diarrhea, weakness, decreased PO intake for a few days. Pt reports being diagnosed +COVID a few days ago. On exam, AOx3, speaking in complete sentences. Unlabored, spontaneous respirations, NAD, O2 sat 95% RA. Abdomen soft, non-tender, non-distended. Pt denies CP, SOB, n/v, fever/chills at this time. Seen and evaluated by MD.

## 2021-01-15 NOTE — ED PROVIDER NOTE - NS ED ROS FT
Review of Systems:  · Constitutional: no chills, no fever, no night sweats, no weight loss  · Nose: no epitaxis but with nasal congestion  · Mouth/Throat: no difficulty in swallowing, trachea midline, uvula midline  · Respiratory: no cough, no exertional dyspnea, no hemoptysis, no orthopnea, no shortness of breath  · Gastrointestinal: no abdominal pain, no diarrhea, no melena, no nausea, no vomiting  · Genitourinary: no difficulty urinating, no dysuria, no hematuria  · MUSCULOSKELETAL: FROM of all extremities  · Skin: no abrasion; no bruising; no laceration  · Neurological: no change in level of consciousness, no headache, no seizures  · Psychiatric: no anxiety, no depression  · Endocrine: no excessive urination  · Heme/Lymph: no anemia, no easy bleeding  · Allergic/Immunologic: IMMUNIZATIONS UTD  · ROS STATEMENT: all other ROS negative except as per HPI

## 2021-01-15 NOTE — ED ADULT TRIAGE NOTE - CHIEF COMPLAINT QUOTE
diarrhea, went to PMD two days ago, got call yesterday that he is covid + - states "I live alone and no one is there to take care of me and I feel very bad"

## 2021-01-15 NOTE — ED PROVIDER NOTE - CLINICAL SUMMARY MEDICAL DECISION MAKING FREE TEXT BOX
Patient is an 81 y.o M w/ PMH HTN who presents w/ dry cough, malaise, and congestion in the setting of a recent covid 19 infection. Currently not requiring supplemental O2 Patient is an 81 y.o M w/ PMH HTN who presents w/ dry cough, malaise, and congestion in the setting of a recent covid 19 infection. Currently not requiring supplemental O2 and patient meets criteria for monoclonal ab (symptom onset <10 days, obese, age 81)

## 2021-01-15 NOTE — ED PROVIDER NOTE - ATTENDING CONTRIBUTION TO CARE
I, Luis Villar, performed a history and physical exam of the patient and discussed their management with the resident and /or advanced care provider. I reviewed the resident and /or ACP's note and agree with the documented findings and plan of care. I was present and available for all procedures.  Patient with mild Covid symptoms oxygenating well on RA and positive test as outpatient.  Will arrange CARES program and MAB infusion for patient.  Patient stable for outpatient quarantine and f/u care.

## 2021-01-15 NOTE — ED PROVIDER NOTE - NSFOLLOWUPINSTRUCTIONS_ED_ALL_ED_FT
You came to the hospital because you were experiencing symptoms from a COVID 19 infection. You recently had a covid 19 test done which was positive. You are currently not having fevers or requiring any oxygen. Please take tylenol and mucinex as needed for your symptoms. Be sure to drink plenty of fluids and eat as best you can. Please be sure to quarantine yourself, as per CDC guidelines, for at least 10 days from the onset of your symptoms AND at least 24 hours of not having fevers without anti-pyretic medications (such as tylenol). If you develop shortness of breath or chest pain, please return to the emergency department immediately.

## 2021-01-15 NOTE — ED PROVIDER NOTE - PROGRESS NOTE DETAILS
Patient not requiring supplemental O2. Able to tolerate PO. Discussed potential benefits of monoclonal ab as he meets criteria. Deferred decision to son, Deepak, who states that it would be difficult for him to get to the monoclonal ab site and would rather he not get it as both he and his motehr had COVID 19 and recovered on their own.

## 2021-01-15 NOTE — ED PROVIDER NOTE - PHYSICAL EXAMINATION
PHYSICAL EXAM:  GENERAL: NAD, obese  HEAD:  Atraumatic, Normocephalic  EYES: EOMI, PERRLA, conjunctiva and sclera clear  NECK: Supple, No JVD  CHEST/LUNG: Clear to auscultation bilaterally; No wheeze  HEART: Regular rate and rhythm; No murmurs, rubs, or gallops  ABDOMEN: Soft, Nontender, Nondistended; Bowel sounds present  EXTREMITIES:  2+ Peripheral Pulses, No clubbing, cyanosis, or edema  PSYCH: AAOx3  NEUROLOGY: non-focal  SKIN: No rashes or lesions

## 2021-01-15 NOTE — ED PROVIDER NOTE - NSPTACCESSSVCSAPPTDETAILS_ED_ALL_ED_FT
Please help arrange monoclonal antibody infusion and CARES follow-up for patient who qualifies over 64 y/o. Please help arrange CARES follow-up for patient who qualifies over 66 y/o.

## 2021-01-22 ENCOUNTER — EMERGENCY (EMERGENCY)
Facility: HOSPITAL | Age: 81
LOS: 1 days | Discharge: OTHER INSTITUTION | End: 2021-01-22
Attending: EMERGENCY MEDICINE
Payer: MEDICARE

## 2021-01-22 ENCOUNTER — INPATIENT (INPATIENT)
Facility: HOSPITAL | Age: 81
LOS: 3 days | Discharge: HOME | DRG: 177 | End: 2021-01-26
Attending: INTERNAL MEDICINE | Admitting: INTERNAL MEDICINE
Payer: MEDICARE

## 2021-01-22 VITALS
RESPIRATION RATE: 22 BRPM | HEART RATE: 86 BPM | TEMPERATURE: 99 F | SYSTOLIC BLOOD PRESSURE: 138 MMHG | HEIGHT: 69 IN | OXYGEN SATURATION: 96 % | DIASTOLIC BLOOD PRESSURE: 68 MMHG

## 2021-01-22 VITALS — RESPIRATION RATE: 20 BRPM | OXYGEN SATURATION: 97 % | HEART RATE: 71 BPM

## 2021-01-22 VITALS — DIASTOLIC BLOOD PRESSURE: 92 MMHG | SYSTOLIC BLOOD PRESSURE: 161 MMHG | RESPIRATION RATE: 18 BRPM

## 2021-01-22 DIAGNOSIS — J96.01 ACUTE RESPIRATORY FAILURE WITH HYPOXIA: ICD-10-CM

## 2021-01-22 DIAGNOSIS — R63.8 OTHER SYMPTOMS AND SIGNS CONCERNING FOOD AND FLUID INTAKE: ICD-10-CM

## 2021-01-22 DIAGNOSIS — Z98.89 OTHER SPECIFIED POSTPROCEDURAL STATES: Chronic | ICD-10-CM

## 2021-01-22 DIAGNOSIS — U07.1 COVID-19: ICD-10-CM

## 2021-01-22 DIAGNOSIS — R09.89 OTHER SPECIFIED SYMPTOMS AND SIGNS INVOLVING THE CIRCULATORY AND RESPIRATORY SYSTEMS: ICD-10-CM

## 2021-01-22 DIAGNOSIS — I26.99 OTHER PULMONARY EMBOLISM WITHOUT ACUTE COR PULMONALE: ICD-10-CM

## 2021-01-22 DIAGNOSIS — N20.0 CALCULUS OF KIDNEY: ICD-10-CM

## 2021-01-22 DIAGNOSIS — I10 ESSENTIAL (PRIMARY) HYPERTENSION: ICD-10-CM

## 2021-01-22 LAB
ALBUMIN SERPL ELPH-MCNC: 3.1 G/DL — LOW (ref 3.3–5)
ALP SERPL-CCNC: 61 U/L — SIGNIFICANT CHANGE UP (ref 40–120)
ALT FLD-CCNC: 32 U/L — SIGNIFICANT CHANGE UP (ref 10–45)
ANION GAP SERPL CALC-SCNC: 12 MMOL/L — SIGNIFICANT CHANGE UP (ref 5–17)
APTT BLD: 143.1 SEC — CRITICAL HIGH (ref 27.5–35.5)
AST SERPL-CCNC: 30 U/L — SIGNIFICANT CHANGE UP (ref 10–40)
BASOPHILS # BLD AUTO: 0.02 K/UL — SIGNIFICANT CHANGE UP (ref 0–0.2)
BASOPHILS NFR BLD AUTO: 0.3 % — SIGNIFICANT CHANGE UP (ref 0–2)
BILIRUB SERPL-MCNC: 0.6 MG/DL — SIGNIFICANT CHANGE UP (ref 0.2–1.2)
BUN SERPL-MCNC: 15 MG/DL — SIGNIFICANT CHANGE UP (ref 7–23)
CALCIUM SERPL-MCNC: 9.1 MG/DL — SIGNIFICANT CHANGE UP (ref 8.4–10.5)
CHLORIDE SERPL-SCNC: 103 MMOL/L — SIGNIFICANT CHANGE UP (ref 96–108)
CO2 SERPL-SCNC: 25 MMOL/L — SIGNIFICANT CHANGE UP (ref 22–31)
CREAT SERPL-MCNC: 1.18 MG/DL — SIGNIFICANT CHANGE UP (ref 0.5–1.3)
CRP SERPL-MCNC: 17.32 MG/DL — HIGH (ref 0–0.4)
D DIMER BLD IA.RAPID-MCNC: HIGH NG/ML DDU
D DIMER BLD IA.RAPID-MCNC: HIGH NG/ML DDU
EOSINOPHIL # BLD AUTO: 0.1 K/UL — SIGNIFICANT CHANGE UP (ref 0–0.5)
EOSINOPHIL NFR BLD AUTO: 1.3 % — SIGNIFICANT CHANGE UP (ref 0–6)
FERRITIN SERPL-MCNC: 1305 NG/ML — HIGH (ref 30–400)
GAS PNL BLDV: SIGNIFICANT CHANGE UP
GLUCOSE BLDC GLUCOMTR-MCNC: 120 MG/DL — HIGH (ref 70–99)
GLUCOSE SERPL-MCNC: 129 MG/DL — HIGH (ref 70–99)
HCT VFR BLD CALC: 39.3 % — SIGNIFICANT CHANGE UP (ref 39–50)
HGB BLD-MCNC: 13.3 G/DL — SIGNIFICANT CHANGE UP (ref 13–17)
HIV 1 & 2 AB SERPL IA.RAPID: SIGNIFICANT CHANGE UP
IMM GRANULOCYTES NFR BLD AUTO: 1.6 % — HIGH (ref 0–1.5)
INR BLD: 1.47 — HIGH (ref 0.88–1.16)
LYMPHOCYTES # BLD AUTO: 0.74 K/UL — LOW (ref 1–3.3)
LYMPHOCYTES # BLD AUTO: 9.4 % — LOW (ref 13–44)
MCHC RBC-ENTMCNC: 30.6 PG — SIGNIFICANT CHANGE UP (ref 27–34)
MCHC RBC-ENTMCNC: 33.8 GM/DL — SIGNIFICANT CHANGE UP (ref 32–36)
MCV RBC AUTO: 90.6 FL — SIGNIFICANT CHANGE UP (ref 80–100)
MONOCYTES # BLD AUTO: 0.55 K/UL — SIGNIFICANT CHANGE UP (ref 0–0.9)
MONOCYTES NFR BLD AUTO: 7 % — SIGNIFICANT CHANGE UP (ref 2–14)
NEUTROPHILS # BLD AUTO: 6.37 K/UL — SIGNIFICANT CHANGE UP (ref 1.8–7.4)
NEUTROPHILS NFR BLD AUTO: 80.4 % — HIGH (ref 43–77)
NRBC # BLD: 0 /100 WBCS — SIGNIFICANT CHANGE UP (ref 0–0)
NT-PROBNP SERPL-SCNC: 2018 PG/ML — HIGH (ref 0–300)
PLATELET # BLD AUTO: 305 K/UL — SIGNIFICANT CHANGE UP (ref 150–400)
POTASSIUM SERPL-MCNC: 4.1 MMOL/L — SIGNIFICANT CHANGE UP (ref 3.5–5.3)
POTASSIUM SERPL-SCNC: 4.1 MMOL/L — SIGNIFICANT CHANGE UP (ref 3.5–5.3)
PROCALCITONIN SERPL-MCNC: 0.11 NG/ML — HIGH (ref 0.02–0.1)
PROT SERPL-MCNC: 7.4 G/DL — SIGNIFICANT CHANGE UP (ref 6–8.3)
PROTHROM AB SERPL-ACNC: 17.3 SEC — HIGH (ref 10.6–13.6)
RBC # BLD: 4.34 M/UL — SIGNIFICANT CHANGE UP (ref 4.2–5.8)
RBC # FLD: 12.5 % — SIGNIFICANT CHANGE UP (ref 10.3–14.5)
SARS-COV-2 RNA SPEC QL NAA+PROBE: DETECTED
SODIUM SERPL-SCNC: 140 MMOL/L — SIGNIFICANT CHANGE UP (ref 135–145)
TROPONIN T SERPL-MCNC: <0.01 NG/ML — SIGNIFICANT CHANGE UP (ref 0–0.01)
WBC # BLD: 7.91 K/UL — SIGNIFICANT CHANGE UP (ref 3.8–10.5)
WBC # FLD AUTO: 7.91 K/UL — SIGNIFICANT CHANGE UP (ref 3.8–10.5)

## 2021-01-22 PROCEDURE — 82803 BLOOD GASES ANY COMBINATION: CPT

## 2021-01-22 PROCEDURE — 71275 CT ANGIOGRAPHY CHEST: CPT | Mod: 26,MA

## 2021-01-22 PROCEDURE — 99285 EMERGENCY DEPT VISIT HI MDM: CPT

## 2021-01-22 PROCEDURE — 85379 FIBRIN DEGRADATION QUANT: CPT

## 2021-01-22 PROCEDURE — 85018 HEMOGLOBIN: CPT

## 2021-01-22 PROCEDURE — 86140 C-REACTIVE PROTEIN: CPT

## 2021-01-22 PROCEDURE — 82330 ASSAY OF CALCIUM: CPT

## 2021-01-22 PROCEDURE — 71045 X-RAY EXAM CHEST 1 VIEW: CPT

## 2021-01-22 PROCEDURE — 83605 ASSAY OF LACTIC ACID: CPT

## 2021-01-22 PROCEDURE — 82435 ASSAY OF BLOOD CHLORIDE: CPT

## 2021-01-22 PROCEDURE — 84145 PROCALCITONIN (PCT): CPT

## 2021-01-22 PROCEDURE — 96374 THER/PROPH/DIAG INJ IV PUSH: CPT | Mod: XU

## 2021-01-22 PROCEDURE — 93010 ELECTROCARDIOGRAM REPORT: CPT

## 2021-01-22 PROCEDURE — U0005: CPT

## 2021-01-22 PROCEDURE — 86703 HIV-1/HIV-2 1 RESULT ANTBDY: CPT

## 2021-01-22 PROCEDURE — 82947 ASSAY GLUCOSE BLOOD QUANT: CPT

## 2021-01-22 PROCEDURE — 99223 1ST HOSP IP/OBS HIGH 75: CPT | Mod: GC

## 2021-01-22 PROCEDURE — 82728 ASSAY OF FERRITIN: CPT

## 2021-01-22 PROCEDURE — 96375 TX/PRO/DX INJ NEW DRUG ADDON: CPT | Mod: XU

## 2021-01-22 PROCEDURE — 85014 HEMATOCRIT: CPT

## 2021-01-22 PROCEDURE — 71275 CT ANGIOGRAPHY CHEST: CPT

## 2021-01-22 PROCEDURE — 93005 ELECTROCARDIOGRAM TRACING: CPT

## 2021-01-22 PROCEDURE — 84295 ASSAY OF SERUM SODIUM: CPT

## 2021-01-22 PROCEDURE — 85025 COMPLETE CBC W/AUTO DIFF WBC: CPT

## 2021-01-22 PROCEDURE — U0003: CPT

## 2021-01-22 PROCEDURE — 80053 COMPREHEN METABOLIC PANEL: CPT

## 2021-01-22 PROCEDURE — 84132 ASSAY OF SERUM POTASSIUM: CPT

## 2021-01-22 PROCEDURE — 71045 X-RAY EXAM CHEST 1 VIEW: CPT | Mod: 26

## 2021-01-22 PROCEDURE — 99285 EMERGENCY DEPT VISIT HI MDM: CPT | Mod: 25

## 2021-01-22 RX ORDER — REMDESIVIR 5 MG/ML
INJECTION INTRAVENOUS
Refills: 0 | Status: COMPLETED | OUTPATIENT
Start: 2021-01-25 | End: 2021-01-26

## 2021-01-22 RX ORDER — SODIUM CHLORIDE 9 MG/ML
1000 INJECTION, SOLUTION INTRAVENOUS
Refills: 0 | Status: DISCONTINUED | OUTPATIENT
Start: 2021-01-22 | End: 2021-01-26

## 2021-01-22 RX ORDER — DEXAMETHASONE 0.5 MG/5ML
6 ELIXIR ORAL EVERY 24 HOURS
Refills: 0 | Status: DISCONTINUED | OUTPATIENT
Start: 2021-01-23 | End: 2021-01-26

## 2021-01-22 RX ORDER — METOPROLOL TARTRATE 50 MG
50 TABLET ORAL
Refills: 0 | Status: DISCONTINUED | OUTPATIENT
Start: 2021-01-22 | End: 2021-01-26

## 2021-01-22 RX ORDER — ACETAMINOPHEN 500 MG
650 TABLET ORAL ONCE
Refills: 0 | Status: COMPLETED | OUTPATIENT
Start: 2021-01-22 | End: 2021-01-22

## 2021-01-22 RX ORDER — HEPARIN SODIUM 5000 [USP'U]/ML
3500 INJECTION INTRAVENOUS; SUBCUTANEOUS EVERY 6 HOURS
Refills: 0 | Status: DISCONTINUED | OUTPATIENT
Start: 2021-01-22 | End: 2021-01-25

## 2021-01-22 RX ORDER — INSULIN LISPRO 100/ML
VIAL (ML) SUBCUTANEOUS
Refills: 0 | Status: DISCONTINUED | OUTPATIENT
Start: 2021-01-22 | End: 2021-01-26

## 2021-01-22 RX ORDER — HEPARIN SODIUM 5000 [USP'U]/ML
INJECTION INTRAVENOUS; SUBCUTANEOUS
Qty: 25000 | Refills: 0 | Status: DISCONTINUED | OUTPATIENT
Start: 2021-01-22 | End: 2021-01-25

## 2021-01-22 RX ORDER — LOSARTAN POTASSIUM 100 MG/1
25 TABLET, FILM COATED ORAL DAILY
Refills: 0 | Status: DISCONTINUED | OUTPATIENT
Start: 2021-01-22 | End: 2021-01-26

## 2021-01-22 RX ORDER — REMDESIVIR 5 MG/ML
200 INJECTION INTRAVENOUS EVERY 24 HOURS
Refills: 0 | Status: COMPLETED | OUTPATIENT
Start: 2021-01-22 | End: 2021-01-22

## 2021-01-22 RX ORDER — SODIUM CHLORIDE 9 MG/ML
1000 INJECTION INTRAMUSCULAR; INTRAVENOUS; SUBCUTANEOUS ONCE
Refills: 0 | Status: COMPLETED | OUTPATIENT
Start: 2021-01-22 | End: 2021-01-22

## 2021-01-22 RX ORDER — HEPARIN SODIUM 5000 [USP'U]/ML
7500 INJECTION INTRAVENOUS; SUBCUTANEOUS ONCE
Refills: 0 | Status: COMPLETED | OUTPATIENT
Start: 2021-01-22 | End: 2021-01-22

## 2021-01-22 RX ORDER — GLUCAGON INJECTION, SOLUTION 0.5 MG/.1ML
1 INJECTION, SOLUTION SUBCUTANEOUS ONCE
Refills: 0 | Status: DISCONTINUED | OUTPATIENT
Start: 2021-01-22 | End: 2021-01-26

## 2021-01-22 RX ORDER — DEXTROSE 50 % IN WATER 50 %
12.5 SYRINGE (ML) INTRAVENOUS ONCE
Refills: 0 | Status: DISCONTINUED | OUTPATIENT
Start: 2021-01-22 | End: 2021-01-26

## 2021-01-22 RX ORDER — DEXTROSE 50 % IN WATER 50 %
25 SYRINGE (ML) INTRAVENOUS ONCE
Refills: 0 | Status: DISCONTINUED | OUTPATIENT
Start: 2021-01-22 | End: 2021-01-26

## 2021-01-22 RX ORDER — REMDESIVIR 5 MG/ML
100 INJECTION INTRAVENOUS EVERY 24 HOURS
Refills: 0 | Status: COMPLETED | OUTPATIENT
Start: 2021-01-23 | End: 2021-01-26

## 2021-01-22 RX ORDER — ENOXAPARIN SODIUM 100 MG/ML
100 INJECTION SUBCUTANEOUS EVERY 12 HOURS
Refills: 0 | Status: DISCONTINUED | OUTPATIENT
Start: 2021-01-22 | End: 2021-01-24

## 2021-01-22 RX ORDER — DEXAMETHASONE 0.5 MG/5ML
6 ELIXIR ORAL ONCE
Refills: 0 | Status: COMPLETED | OUTPATIENT
Start: 2021-01-22 | End: 2021-01-22

## 2021-01-22 RX ORDER — DEXTROSE 50 % IN WATER 50 %
15 SYRINGE (ML) INTRAVENOUS ONCE
Refills: 0 | Status: DISCONTINUED | OUTPATIENT
Start: 2021-01-22 | End: 2021-01-26

## 2021-01-22 RX ORDER — HEPARIN SODIUM 5000 [USP'U]/ML
7500 INJECTION INTRAVENOUS; SUBCUTANEOUS EVERY 6 HOURS
Refills: 0 | Status: DISCONTINUED | OUTPATIENT
Start: 2021-01-22 | End: 2021-01-25

## 2021-01-22 RX ADMIN — Medication 6 MILLIGRAM(S): at 10:57

## 2021-01-22 RX ADMIN — HEPARIN SODIUM 1700 UNIT(S)/HR: 5000 INJECTION INTRAVENOUS; SUBCUTANEOUS at 12:51

## 2021-01-22 RX ADMIN — ENOXAPARIN SODIUM 100 MILLIGRAM(S): 100 INJECTION SUBCUTANEOUS at 19:26

## 2021-01-22 RX ADMIN — Medication 650 MILLIGRAM(S): at 09:59

## 2021-01-22 RX ADMIN — HEPARIN SODIUM 7500 UNIT(S): 5000 INJECTION INTRAVENOUS; SUBCUTANEOUS at 12:50

## 2021-01-22 RX ADMIN — LOSARTAN POTASSIUM 25 MILLIGRAM(S): 100 TABLET, FILM COATED ORAL at 17:35

## 2021-01-22 RX ADMIN — Medication 50 MILLIGRAM(S): at 17:35

## 2021-01-22 RX ADMIN — SODIUM CHLORIDE 1000 MILLILITER(S): 9 INJECTION INTRAMUSCULAR; INTRAVENOUS; SUBCUTANEOUS at 09:59

## 2021-01-22 RX ADMIN — REMDESIVIR 200 MILLIGRAM(S): 5 INJECTION INTRAVENOUS at 19:27

## 2021-01-22 NOTE — ED PROVIDER NOTE - OBJECTIVE STATEMENT
81 M hx of kidney stones, denies other PMHx, presenting for worsening COVID like symptoms. This is day 10 of symptoms; he is known COVID +ve. He endorses worsenign dry cough, SoB, and anosmia and congestion. He denies chest pain, n/v/d, dysuria, flank pain, blood in stool or urine. 81 M hx of kidney stones, HTN on metoprolol and losartan, presenting for worsening COVID like symptoms. This is day 10 of symptoms; he is known COVID +ve. He endorses worsenign dry cough, SoB, and anosmia and congestion. He denies chest pain, n/v/d, dysuria, flank pain, blood in stool or urine. He was never a smoker.

## 2021-01-22 NOTE — ED ADULT NURSE NOTE - OBJECTIVE STATEMENT
80 y/o male coming to the ER with c/o SOB and cough. A&)x3. Ambulatory. PMH HTN and kidney stones. Patient reports he tested positive for COVID about 10 days ago, starting yesterday he began to experience SOB and cough. Patient reports his home SPO2 was 86% prompting him to come to the ER. Patient endorses a productive cough. Abdomen is soft, non distended, non tender. Capillary refill is less than 2 seconds. Patient stating at 91% on room air, placed on 2L NC and SPO2 improved to 98%. Patient denies chest pain, fever, chills, n/v/d, urinary symptoms, abdominal pain. Safety measures maintained. Bed in the lowest position. Call bell within reach.  MD at the bedside. No acute distress noted or further complaints at this time. 82 y/o male coming to the ER with c/o SOB and cough. A&)x3. Ambulatory. PMH HTN and kidney stones. Patient reports he tested positive for COVID about 10 days ago, starting yesterday he began to experience SOB and cough. Patient reports his home SPO2 was 86% prompting him to come to the ER. Patient endorses a productive cough and loss of taste. Abdomen is soft, non distended, non tender. Capillary refill is less than 2 seconds. Patient stating at 91% on room air, placed on 2L NC and SPO2 improved to 98%. Patient denies chest pain, fever, chills, n/v/d, urinary symptoms, abdominal pain. Safety measures maintained. Bed in the lowest position. Call bell within reach.  MD at the bedside. No acute distress noted or further complaints at this time.

## 2021-01-22 NOTE — H&P ADULT - NSICDXPASTMEDICALHX_GEN_ALL_CORE_FT
PAST MEDICAL HISTORY:  Bladder stones     COVID-19 April 2020    HTN (hypertension)     Kidney stones     Osteoarthritis

## 2021-01-22 NOTE — ED PROVIDER NOTE - ATTENDING CONTRIBUTION TO CARE
Attending MD Hernandez: I personally have seen and examined this patient.  Resident note reviewed and agree on plan of care and except where noted.  See below for details.     Seen in [facility]    81M with PMH/PSH including nephrolithiasis, HTN on Metoprolol, Losartan, OA presents to the ED with known COVID and worsening shortness of breath, nonproductive cough.  Reports symptoms develop about 10 days.  Review of EMR reveals patient was seen here on 1/15/21    On arrival, patient was 86% on RA, On 2L NC, 95% in stretcher without exertion. Attending MD Hernandez: I personally have seen and examined this patient.  Resident note reviewed and agree on plan of care and except where noted.  See below for details.     Seen in [facility]    81M with PMH/PSH including nephrolithiasis, HTN on Metoprolol, Losartan, OA presents to the ED with known COVID and worsening shortness of breath, nonproductive cough.  Review of EMR reveals patient was seen here on 1/15/21 with symptoms that started two days prior to his visit.      On arrival, patient was 86% on RA, On 2L NC, 95% in stretcher without exertion.    TO BE COMPLETED Attending MD Hernandez: I personally have seen and examined this patient.  Resident note reviewed and agree on plan of care and except where noted.  See below for details.     Seen in [facility]    81M with PMH/PSH including nephrolithiasis, HTN on Metoprolol, Losartan, OA presents to the ED with known COVID and worsening shortness of breath, nonproductive cough.  Review of EMR reveals patient was seen here on 1/15/21 with symptoms that started two days prior to that visit.  Reports malaise, decreased appetite.  Reports breathing became markedly worse last night at 9pm, reports increased coughing, reports phlegm, reports inability to sleep secondary to difficulty breathing and increased cough.  Denies chest pain.  Denies abdominal pain, nausea, vomiting, diarrhea, blood in stools. Denies dysuria, hematuria, change in urinary habits including frequency, urgency. Denies fevers, chills.  A ten (10) point review of systems was negative other than as stated in the HPI or elsewhere in the chart.     Exam:   General: NAD  HENT: head NCAT, airway patent with moist mucous membranes  Eyes: PERRL  Lungs: lungs scattered crackles at bilateral bases, cough with inspiratory effort, no wheezing, no rhonchi, speaking in short sentences, +increased work of breathing, no retractions, no accessory muscle use, on arrival, patient was 86% on RA, now on 2L NC, low 90s, increased to 5L with improvement to mid 90s, did not ambulate patient  Cardiac: +S1S2, no m/r/g  GI: abdomen soft with +BS, NT, ND  : no CVAT  MSK: FROM at neck, no calf tenderness, swelling, erythema or warmth  Neuro: moving all extremities with 5/5 strength bilateral upper and lower extremities, sensory grossly intact, no gross neuro deficits  Psych: normal mood and affect     A/P: 81M with known COVID, now with increased work of breathing and oxygen requirement, will keep on O2, labs including inflammatory markers ProCal, CRP, Dimer, Davidson swab for transfer, discussed with patient about transfer, amenable, will obtain EKG, CXR as well, place on monitor, admit/transfer

## 2021-01-22 NOTE — H&P ADULT - NSHPLABSRESULTS_GEN_ALL_CORE
.  LABS:                         13.3   7.91  )-----------( 305      ( 22 Jan 2021 09:49 )             39.3     01-22    140  |  103  |  15  ----------------------------<  129<H>  4.1   |  25  |  1.18    Ca    9.1      22 Jan 2021 09:49    TPro  7.4  /  Alb  3.1<L>  /  TBili  0.6  /  DBili  x   /  AST  30  /  ALT  32  /  AlkPhos  61  01-22        CARDIAC MARKERS ( 22 Jan 2021 15:48 )  x     / <0.01 ng/mL / x     / x     / x          Serum Pro-Brain Natriuretic Peptide: 2018 pg/mL (01-22 @ 15:48)        RADIOLOGY, EKG & ADDITIONAL TESTS: Reviewed.

## 2021-01-22 NOTE — H&P ADULT - PROBLEM SELECTOR PLAN 1
Patient w/ hypoxia in the setting of COVID and PE.   - C/w supplemental O2 PRN  - Treatment for COVID and PE as below

## 2021-01-22 NOTE — ED ADULT NURSE REASSESSMENT NOTE - NS ED NURSE REASSESS COMMENT FT1
Patient to be transferred. MD Saleem awaiting a call back from the transfer center. Patient resting in the stretcher at this time with no active complaints.

## 2021-01-22 NOTE — ED PROVIDER NOTE - CLINICAL SUMMARY MEDICAL DECISION MAKING FREE TEXT BOX
Pt p/w COVID like symptoms, SoB, cough, ?fevers at home. Hypoxic here 86% on RA in triage, 92% while speaking w/ him on room air in exam room. Suspect sequelae of COVID symptoms. Will obtain labs, EKG, CXR, provide analgesia, likely will need admission

## 2021-01-22 NOTE — H&P ADULT - HISTORY OF PRESENT ILLNESS
82 y/o M with hx of recurrent renal stones, HTN, OA presenting to [facility] w/ complaints of hypoxia. Patient initially noted to be COVID positive on 1/15 with symptoms that began on 1/13. He reports that starting yesterday, he began experiencing worsening pleuritic CP and cough w/ brown phlegm that worsened at 9pm causing difficulty sleeping. He checked his pulse ox which was 94% and he was afebrile. Today AM, he woke up feeling even more SOB and pulse ox noted to be 86%. He went to [facility]. ROS notable for cough anosmia/loss of taste and pleuritic CP but negative for fevers, chills, HA, vision changes, abdominal pain, N/V/D/C, melena, dysuria, hematuria, LE edema.     In the ED, vitals T-98.8, HR-86, BP-138/68, RR-22, SpO2-96% on 2L NC.  80 y/o M with hx of recurrent renal stones, HTN, OA presenting to [facility] w/ complaints of hypoxia. Patient initially noted to be COVID positive on 1/15 with symptoms that began on 1/13. He reports that starting yesterday, he began experiencing worsening pleuritic CP and cough w/ brown phlegm that worsened at 9pm causing difficulty sleeping. He checked his pulse ox which was 94% and he was afebrile. Today AM, he woke up feeling even more SOB and pulse ox noted to be 86%. He went to [facility]. ROS notable for cough anosmia/loss of taste and pleuritic CP but negative for fevers, chills, HA, vision changes, abdominal pain, N/V/D/C, melena, dysuria, hematuria, LE edema.     In the ED, vitals T-98.8, HR-86, BP-138/68, RR-22, SpO2-96% on 2L NC. Labs significant for lymphopenia, d-dimer 30,774, ferritin 1305, cr 1.18 (baseline), CRP 17.32, procal 0.11, BNP 2018. COVID +, HIV negative. CTPE w/ Pulmonary emboli in segmental arteries in all lobes. No evidence of right heart strain. Multifocal groundglass and consolidative opacities reflecting Covid 19 pneumonia. He received decadron and was started on hep gtt and transferred to [facility] for further management.

## 2021-01-22 NOTE — ED PROVIDER NOTE - PROGRESS NOTE DETAILS
called [facility] to initiate transfer to [facility]; hospitalist was unavailable they will call back Informed Dr Archer of pt's PE; pt being weighed now for heparin gtt

## 2021-01-22 NOTE — H&P ADULT - NSHPPHYSICALEXAM_GEN_ALL_CORE
.  VITAL SIGNS:  T(C): 37 (01-22-21 @ 09:26), Max: 37.1 (01-22-21 @ 08:53)  T(F): 98.6 (01-22-21 @ 09:26), Max: 98.8 (01-22-21 @ 08:53)  HR: 71 (01-22-21 @ 15:13) (71 - 86)  BP: 161/92 (01-22-21 @ 15:04) (138/68 - 161/92)  BP(mean): --  RR: 18 (01-22-21 @ 15:13) (18 - 23)  SpO2: 99% (01-22-21 @ 15:13) (90% - 99%)  Wt(kg): --    PHYSICAL EXAM:    Constitutional: overweight male resting comfortably in bed; NAD  Head: NC/AT  Eyes: PERRL, EOMI, anicteric sclera  ENT: no nasal discharge; uvula midline, no oropharyngeal erythema or exudates; dry mucosal membranes  Neck: supple; no JVD or thyromegaly  Respiratory: CTA B/L; no W/R/R, no retractions  Cardiac: +S1/S2; RRR; no M/R/G; PMI non-displaced  Gastrointestinal: soft, NT/ND; no rebound or guarding; +BSx4  Genitourinary: normal external genitalia  Back: spine midline, no bony tenderness or step-offs; no CVAT B/L  Extremities: WWP, no clubbing or cyanosis; no peripheral edema. Capillary refill <2 sec  Musculoskeletal: NROM x4; no joint swelling, tenderness or erythema  Vascular: 2+ radial, femoral, DP/PT pulses B/L  Dermatologic: skin warm, dry and intact; no rashes, wounds, or scars  Lymphatic: no submandibular or cervical LAD  Neurologic: AAOx3; CNII-XII grossly intact; no focal deficits  Psychiatric: affect and characteristics of appearance, verbalizations, behaviors are appropriate

## 2021-01-22 NOTE — H&P ADULT - PROBLEM SELECTOR PLAN 2
Patient w/ covid first diagnosed on 1/15 (sxs started on 1/13). W/ worsening sxs and hypoxia  - C/w decadron 6mg qd for 10 day duration (1/22-1/31)  - Will start remdesivir   - F/u daily labs

## 2021-01-22 NOTE — H&P ADULT - PROBLEM SELECTOR PLAN 5
Pt w/ recurrent kidney stones w/ several hospitalizations (most recent 2 weeks ago). No active sxs. Just completed outpt abx  - NTD; monitor for sxs

## 2021-01-22 NOTE — H&P ADULT - PROBLEM SELECTOR PLAN 3
Patient w/ PE. No history of clots in the past. In the setting of COVID. Normal trop. BNP elevated. No evidence of RHS on CT  - F/u echo  - Will switch from hep gtt to lovenox BID dosing

## 2021-01-22 NOTE — H&P ADULT - ASSESSMENT
80 y/o M with hx of recurrent renal stones, HTN, OA presenting to [facility] w/ complaints of hypoxia admitted for COVID w/ hypoxia and PE

## 2021-01-22 NOTE — H&P ADULT - ATTENDING COMMENTS
Agree with plan as documented by resident     -- f/u echocardiogram  -- lovenox for PE   -- resume BP meds as patient 140/100 during my examination   -- continue decadron and remdesevir

## 2021-01-23 DIAGNOSIS — R73.03 PREDIABETES: ICD-10-CM

## 2021-01-23 DIAGNOSIS — I26.99 OTHER PULMONARY EMBOLISM WITHOUT ACUTE COR PULMONALE: ICD-10-CM

## 2021-01-23 DIAGNOSIS — I10 ESSENTIAL (PRIMARY) HYPERTENSION: ICD-10-CM

## 2021-01-23 LAB
A1C WITH ESTIMATED AVERAGE GLUCOSE RESULT: 5.7 % — HIGH (ref 4–5.6)
ALBUMIN SERPL ELPH-MCNC: 2.8 G/DL — LOW (ref 3.3–5)
ALP SERPL-CCNC: 52 U/L — SIGNIFICANT CHANGE UP (ref 40–120)
ALT FLD-CCNC: 30 U/L — SIGNIFICANT CHANGE UP (ref 10–45)
ANION GAP SERPL CALC-SCNC: 13 MMOL/L — SIGNIFICANT CHANGE UP (ref 5–17)
AST SERPL-CCNC: 27 U/L — SIGNIFICANT CHANGE UP (ref 10–40)
BASOPHILS # BLD AUTO: 0.01 K/UL — SIGNIFICANT CHANGE UP (ref 0–0.2)
BASOPHILS NFR BLD AUTO: 0.1 % — SIGNIFICANT CHANGE UP (ref 0–2)
BILIRUB SERPL-MCNC: 0.3 MG/DL — SIGNIFICANT CHANGE UP (ref 0.2–1.2)
BUN SERPL-MCNC: 20 MG/DL — SIGNIFICANT CHANGE UP (ref 7–23)
CALCIUM SERPL-MCNC: 9 MG/DL — SIGNIFICANT CHANGE UP (ref 8.4–10.5)
CHLORIDE SERPL-SCNC: 104 MMOL/L — SIGNIFICANT CHANGE UP (ref 96–108)
CO2 SERPL-SCNC: 23 MMOL/L — SIGNIFICANT CHANGE UP (ref 22–31)
CREAT SERPL-MCNC: 1.08 MG/DL — SIGNIFICANT CHANGE UP (ref 0.5–1.3)
CRP SERPL-MCNC: 10.29 MG/DL — HIGH (ref 0–0.4)
D DIMER BLD IA.RAPID-MCNC: 9793 NG/ML DDU — HIGH
EOSINOPHIL # BLD AUTO: 0 K/UL — SIGNIFICANT CHANGE UP (ref 0–0.5)
EOSINOPHIL NFR BLD AUTO: 0 % — SIGNIFICANT CHANGE UP (ref 0–6)
ESTIMATED AVERAGE GLUCOSE: 117 MG/DL — HIGH (ref 68–114)
FERRITIN SERPL-MCNC: 1165 NG/ML — HIGH (ref 30–400)
GLUCOSE BLDC GLUCOMTR-MCNC: 133 MG/DL — HIGH (ref 70–99)
GLUCOSE BLDC GLUCOMTR-MCNC: 135 MG/DL — HIGH (ref 70–99)
GLUCOSE BLDC GLUCOMTR-MCNC: 94 MG/DL — SIGNIFICANT CHANGE UP (ref 70–99)
GLUCOSE BLDC GLUCOMTR-MCNC: 99 MG/DL — SIGNIFICANT CHANGE UP (ref 70–99)
GLUCOSE SERPL-MCNC: 110 MG/DL — HIGH (ref 70–99)
HCT VFR BLD CALC: 37.3 % — LOW (ref 39–50)
HGB BLD-MCNC: 12.1 G/DL — LOW (ref 13–17)
IMM GRANULOCYTES NFR BLD AUTO: 1 % — SIGNIFICANT CHANGE UP (ref 0–1.5)
LYMPHOCYTES # BLD AUTO: 1.01 K/UL — SIGNIFICANT CHANGE UP (ref 1–3.3)
LYMPHOCYTES # BLD AUTO: 11.6 % — LOW (ref 13–44)
MAGNESIUM SERPL-MCNC: 2 MG/DL — SIGNIFICANT CHANGE UP (ref 1.6–2.6)
MCHC RBC-ENTMCNC: 30.3 PG — SIGNIFICANT CHANGE UP (ref 27–34)
MCHC RBC-ENTMCNC: 32.4 GM/DL — SIGNIFICANT CHANGE UP (ref 32–36)
MCV RBC AUTO: 93.5 FL — SIGNIFICANT CHANGE UP (ref 80–100)
MONOCYTES # BLD AUTO: 0.6 K/UL — SIGNIFICANT CHANGE UP (ref 0–0.9)
MONOCYTES NFR BLD AUTO: 6.9 % — SIGNIFICANT CHANGE UP (ref 2–14)
NEUTROPHILS # BLD AUTO: 7 K/UL — SIGNIFICANT CHANGE UP (ref 1.8–7.4)
NEUTROPHILS NFR BLD AUTO: 80.4 % — HIGH (ref 43–77)
NRBC # BLD: 0 /100 WBCS — SIGNIFICANT CHANGE UP (ref 0–0)
PHOSPHATE SERPL-MCNC: 3.5 MG/DL — SIGNIFICANT CHANGE UP (ref 2.5–4.5)
PLATELET # BLD AUTO: 260 K/UL — SIGNIFICANT CHANGE UP (ref 150–400)
POTASSIUM SERPL-MCNC: 4.2 MMOL/L — SIGNIFICANT CHANGE UP (ref 3.5–5.3)
POTASSIUM SERPL-SCNC: 4.2 MMOL/L — SIGNIFICANT CHANGE UP (ref 3.5–5.3)
PROT SERPL-MCNC: 6.7 G/DL — SIGNIFICANT CHANGE UP (ref 6–8.3)
RBC # BLD: 3.99 M/UL — LOW (ref 4.2–5.8)
RBC # FLD: 12.6 % — SIGNIFICANT CHANGE UP (ref 10.3–14.5)
SODIUM SERPL-SCNC: 140 MMOL/L — SIGNIFICANT CHANGE UP (ref 135–145)
WBC # BLD: 8.71 K/UL — SIGNIFICANT CHANGE UP (ref 3.8–10.5)
WBC # FLD AUTO: 8.71 K/UL — SIGNIFICANT CHANGE UP (ref 3.8–10.5)

## 2021-01-23 PROCEDURE — 99233 SBSQ HOSP IP/OBS HIGH 50: CPT | Mod: GC

## 2021-01-23 RX ADMIN — LOSARTAN POTASSIUM 25 MILLIGRAM(S): 100 TABLET, FILM COATED ORAL at 06:34

## 2021-01-23 RX ADMIN — ENOXAPARIN SODIUM 100 MILLIGRAM(S): 100 INJECTION SUBCUTANEOUS at 18:48

## 2021-01-23 RX ADMIN — Medication 6 MILLIGRAM(S): at 11:50

## 2021-01-23 RX ADMIN — Medication 50 MILLIGRAM(S): at 06:33

## 2021-01-23 RX ADMIN — ENOXAPARIN SODIUM 100 MILLIGRAM(S): 100 INJECTION SUBCUTANEOUS at 06:34

## 2021-01-23 RX ADMIN — Medication 50 MILLIGRAM(S): at 17:29

## 2021-01-23 RX ADMIN — REMDESIVIR 500 MILLIGRAM(S): 5 INJECTION INTRAVENOUS at 17:14

## 2021-01-23 NOTE — PROGRESS NOTE ADULT - PROBLEM SELECTOR PLAN 3
Patient w/ PE. No history of clots in the past. In the setting of COVID. Normal trop. BNP elevated. No evidence of RHS on CT  - F/u echo  - on lovenox BID Patient w/ PE. No history of clots in the past. In the setting of COVID. Normal trop. BNP elevated. No evidence of RHS on CT  - on lovenox BID  -likely transition to eliquis and will be d/c on eliquis

## 2021-01-23 NOTE — ED POST DISCHARGE NOTE - RESULT SUMMARY
Pt was seen in [facility] ED and transferred [facility]. elevated ferritin level clinically correlates with COVID dx. no need for callback at this time

## 2021-01-24 LAB
ALBUMIN SERPL ELPH-MCNC: 3.3 G/DL — SIGNIFICANT CHANGE UP (ref 3.3–5)
ALP SERPL-CCNC: 54 U/L — SIGNIFICANT CHANGE UP (ref 40–120)
ALT FLD-CCNC: 36 U/L — SIGNIFICANT CHANGE UP (ref 10–45)
ANION GAP SERPL CALC-SCNC: 13 MMOL/L — SIGNIFICANT CHANGE UP (ref 5–17)
APTT BLD: 44.3 SEC — HIGH (ref 27.5–35.5)
AST SERPL-CCNC: 30 U/L — SIGNIFICANT CHANGE UP (ref 10–40)
BILIRUB SERPL-MCNC: 0.4 MG/DL — SIGNIFICANT CHANGE UP (ref 0.2–1.2)
BUN SERPL-MCNC: 23 MG/DL — SIGNIFICANT CHANGE UP (ref 7–23)
CALCIUM SERPL-MCNC: 9.9 MG/DL — SIGNIFICANT CHANGE UP (ref 8.4–10.5)
CHLORIDE SERPL-SCNC: 104 MMOL/L — SIGNIFICANT CHANGE UP (ref 96–108)
CO2 SERPL-SCNC: 23 MMOL/L — SIGNIFICANT CHANGE UP (ref 22–31)
CREAT SERPL-MCNC: 1.07 MG/DL — SIGNIFICANT CHANGE UP (ref 0.5–1.3)
CRP SERPL-MCNC: 5.69 MG/DL — HIGH (ref 0–0.4)
D DIMER BLD IA.RAPID-MCNC: 5198 NG/ML DDU — HIGH
FERRITIN SERPL-MCNC: 1204 NG/ML — HIGH (ref 30–400)
GLUCOSE BLDC GLUCOMTR-MCNC: 107 MG/DL — HIGH (ref 70–99)
GLUCOSE BLDC GLUCOMTR-MCNC: 119 MG/DL — HIGH (ref 70–99)
GLUCOSE BLDC GLUCOMTR-MCNC: 148 MG/DL — HIGH (ref 70–99)
GLUCOSE BLDC GLUCOMTR-MCNC: 83 MG/DL — SIGNIFICANT CHANGE UP (ref 70–99)
GLUCOSE SERPL-MCNC: 100 MG/DL — HIGH (ref 70–99)
HCT VFR BLD CALC: 42 % — SIGNIFICANT CHANGE UP (ref 39–50)
HGB BLD-MCNC: 13.4 G/DL — SIGNIFICANT CHANGE UP (ref 13–17)
INR BLD: 1.36 — HIGH (ref 0.88–1.16)
MAGNESIUM SERPL-MCNC: 2.1 MG/DL — SIGNIFICANT CHANGE UP (ref 1.6–2.6)
MCHC RBC-ENTMCNC: 29.9 PG — SIGNIFICANT CHANGE UP (ref 27–34)
MCHC RBC-ENTMCNC: 31.9 GM/DL — LOW (ref 32–36)
MCV RBC AUTO: 93.8 FL — SIGNIFICANT CHANGE UP (ref 80–100)
NRBC # BLD: 0 /100 WBCS — SIGNIFICANT CHANGE UP (ref 0–0)
PHOSPHATE SERPL-MCNC: 3.1 MG/DL — SIGNIFICANT CHANGE UP (ref 2.5–4.5)
PLATELET # BLD AUTO: 305 K/UL — SIGNIFICANT CHANGE UP (ref 150–400)
POTASSIUM SERPL-MCNC: 4.4 MMOL/L — SIGNIFICANT CHANGE UP (ref 3.5–5.3)
POTASSIUM SERPL-SCNC: 4.4 MMOL/L — SIGNIFICANT CHANGE UP (ref 3.5–5.3)
PROT SERPL-MCNC: 7.5 G/DL — SIGNIFICANT CHANGE UP (ref 6–8.3)
PROTHROM AB SERPL-ACNC: 16.1 SEC — HIGH (ref 10.6–13.6)
RBC # BLD: 4.48 M/UL — SIGNIFICANT CHANGE UP (ref 4.2–5.8)
RBC # FLD: 12.4 % — SIGNIFICANT CHANGE UP (ref 10.3–14.5)
SODIUM SERPL-SCNC: 140 MMOL/L — SIGNIFICANT CHANGE UP (ref 135–145)
WBC # BLD: 10.78 K/UL — HIGH (ref 3.8–10.5)
WBC # FLD AUTO: 10.78 K/UL — HIGH (ref 3.8–10.5)

## 2021-01-24 PROCEDURE — 99233 SBSQ HOSP IP/OBS HIGH 50: CPT | Mod: GC

## 2021-01-24 RX ORDER — LANOLIN ALCOHOL/MO/W.PET/CERES
3 CREAM (GRAM) TOPICAL AT BEDTIME
Refills: 0 | Status: DISCONTINUED | OUTPATIENT
Start: 2021-01-24 | End: 2021-01-26

## 2021-01-24 RX ORDER — APIXABAN 2.5 MG/1
10 TABLET, FILM COATED ORAL EVERY 12 HOURS
Refills: 0 | Status: DISCONTINUED | OUTPATIENT
Start: 2021-01-24 | End: 2021-01-26

## 2021-01-24 RX ADMIN — APIXABAN 10 MILLIGRAM(S): 2.5 TABLET, FILM COATED ORAL at 18:06

## 2021-01-24 RX ADMIN — Medication 6 MILLIGRAM(S): at 12:06

## 2021-01-24 RX ADMIN — Medication 3 MILLIGRAM(S): at 22:10

## 2021-01-24 RX ADMIN — Medication 50 MILLIGRAM(S): at 16:22

## 2021-01-24 RX ADMIN — Medication 200 MILLIGRAM(S): at 16:45

## 2021-01-24 RX ADMIN — REMDESIVIR 500 MILLIGRAM(S): 5 INJECTION INTRAVENOUS at 16:33

## 2021-01-24 RX ADMIN — LOSARTAN POTASSIUM 25 MILLIGRAM(S): 100 TABLET, FILM COATED ORAL at 05:52

## 2021-01-24 RX ADMIN — ENOXAPARIN SODIUM 100 MILLIGRAM(S): 100 INJECTION SUBCUTANEOUS at 05:52

## 2021-01-24 RX ADMIN — Medication 50 MILLIGRAM(S): at 05:52

## 2021-01-24 NOTE — PROGRESS NOTE ADULT - PROBLEM SELECTOR PLAN 3
Patient w/ PE. No history of clots in the past. In the setting of COVID. Normal trop. BNP elevated. No evidence of RHS on CT  - on lovenox BID  -likely transition to eliquis and will be d/c on eliquis

## 2021-01-25 ENCOUNTER — TRANSCRIPTION ENCOUNTER (OUTPATIENT)
Age: 81
End: 2021-01-25

## 2021-01-25 LAB
ALBUMIN SERPL ELPH-MCNC: 3.1 G/DL — LOW (ref 3.3–5)
ALP SERPL-CCNC: 47 U/L — SIGNIFICANT CHANGE UP (ref 40–120)
ALT FLD-CCNC: 42 U/L — SIGNIFICANT CHANGE UP (ref 10–45)
ANION GAP SERPL CALC-SCNC: 11 MMOL/L — SIGNIFICANT CHANGE UP (ref 5–17)
AST SERPL-CCNC: 37 U/L — SIGNIFICANT CHANGE UP (ref 10–40)
BASOPHILS # BLD AUTO: 0.01 K/UL — SIGNIFICANT CHANGE UP (ref 0–0.2)
BASOPHILS NFR BLD AUTO: 0.1 % — SIGNIFICANT CHANGE UP (ref 0–2)
BILIRUB SERPL-MCNC: 0.3 MG/DL — SIGNIFICANT CHANGE UP (ref 0.2–1.2)
BUN SERPL-MCNC: 27 MG/DL — HIGH (ref 7–23)
CALCIUM SERPL-MCNC: 9.1 MG/DL — SIGNIFICANT CHANGE UP (ref 8.4–10.5)
CHLORIDE SERPL-SCNC: 103 MMOL/L — SIGNIFICANT CHANGE UP (ref 96–108)
CO2 SERPL-SCNC: 25 MMOL/L — SIGNIFICANT CHANGE UP (ref 22–31)
CREAT SERPL-MCNC: 1.13 MG/DL — SIGNIFICANT CHANGE UP (ref 0.5–1.3)
CRP SERPL-MCNC: 3.67 MG/DL — HIGH (ref 0–0.4)
D DIMER BLD IA.RAPID-MCNC: 3370 NG/ML DDU — HIGH
EOSINOPHIL # BLD AUTO: 0 K/UL — SIGNIFICANT CHANGE UP (ref 0–0.5)
EOSINOPHIL NFR BLD AUTO: 0 % — SIGNIFICANT CHANGE UP (ref 0–6)
FERRITIN SERPL-MCNC: 909 NG/ML — HIGH (ref 30–400)
GLUCOSE BLDC GLUCOMTR-MCNC: 132 MG/DL — HIGH (ref 70–99)
GLUCOSE BLDC GLUCOMTR-MCNC: 166 MG/DL — HIGH (ref 70–99)
GLUCOSE BLDC GLUCOMTR-MCNC: 92 MG/DL — SIGNIFICANT CHANGE UP (ref 70–99)
GLUCOSE BLDC GLUCOMTR-MCNC: 95 MG/DL — SIGNIFICANT CHANGE UP (ref 70–99)
GLUCOSE SERPL-MCNC: 100 MG/DL — HIGH (ref 70–99)
HCT VFR BLD CALC: 36.3 % — LOW (ref 39–50)
HGB BLD-MCNC: 11.7 G/DL — LOW (ref 13–17)
IMM GRANULOCYTES NFR BLD AUTO: 1.8 % — HIGH (ref 0–1.5)
LYMPHOCYTES # BLD AUTO: 1.02 K/UL — SIGNIFICANT CHANGE UP (ref 1–3.3)
LYMPHOCYTES # BLD AUTO: 11.4 % — LOW (ref 13–44)
MAGNESIUM SERPL-MCNC: 1.9 MG/DL — SIGNIFICANT CHANGE UP (ref 1.6–2.6)
MCHC RBC-ENTMCNC: 30 PG — SIGNIFICANT CHANGE UP (ref 27–34)
MCHC RBC-ENTMCNC: 32.2 GM/DL — SIGNIFICANT CHANGE UP (ref 32–36)
MCV RBC AUTO: 93.1 FL — SIGNIFICANT CHANGE UP (ref 80–100)
MONOCYTES # BLD AUTO: 0.62 K/UL — SIGNIFICANT CHANGE UP (ref 0–0.9)
MONOCYTES NFR BLD AUTO: 6.9 % — SIGNIFICANT CHANGE UP (ref 2–14)
NEUTROPHILS # BLD AUTO: 7.17 K/UL — SIGNIFICANT CHANGE UP (ref 1.8–7.4)
NEUTROPHILS NFR BLD AUTO: 79.8 % — HIGH (ref 43–77)
NRBC # BLD: 0 /100 WBCS — SIGNIFICANT CHANGE UP (ref 0–0)
PHOSPHATE SERPL-MCNC: 3.9 MG/DL — SIGNIFICANT CHANGE UP (ref 2.5–4.5)
PLATELET # BLD AUTO: 303 K/UL — SIGNIFICANT CHANGE UP (ref 150–400)
POTASSIUM SERPL-MCNC: 4.4 MMOL/L — SIGNIFICANT CHANGE UP (ref 3.5–5.3)
POTASSIUM SERPL-SCNC: 4.4 MMOL/L — SIGNIFICANT CHANGE UP (ref 3.5–5.3)
PROT SERPL-MCNC: 6.5 G/DL — SIGNIFICANT CHANGE UP (ref 6–8.3)
RBC # BLD: 3.9 M/UL — LOW (ref 4.2–5.8)
RBC # FLD: 12.5 % — SIGNIFICANT CHANGE UP (ref 10.3–14.5)
SODIUM SERPL-SCNC: 139 MMOL/L — SIGNIFICANT CHANGE UP (ref 135–145)
WBC # BLD: 8.98 K/UL — SIGNIFICANT CHANGE UP (ref 3.8–10.5)
WBC # FLD AUTO: 8.98 K/UL — SIGNIFICANT CHANGE UP (ref 3.8–10.5)

## 2021-01-25 PROCEDURE — 99233 SBSQ HOSP IP/OBS HIGH 50: CPT | Mod: GC

## 2021-01-25 RX ORDER — LANOLIN ALCOHOL/MO/W.PET/CERES
3 CREAM (GRAM) TOPICAL ONCE
Refills: 0 | Status: COMPLETED | OUTPATIENT
Start: 2021-01-25 | End: 2021-01-25

## 2021-01-25 RX ADMIN — Medication 3 MILLIGRAM(S): at 11:44

## 2021-01-25 RX ADMIN — Medication 6 MILLIGRAM(S): at 11:44

## 2021-01-25 RX ADMIN — Medication 50 MILLIGRAM(S): at 17:24

## 2021-01-25 RX ADMIN — Medication 50 MILLIGRAM(S): at 05:22

## 2021-01-25 RX ADMIN — APIXABAN 10 MILLIGRAM(S): 2.5 TABLET, FILM COATED ORAL at 05:22

## 2021-01-25 RX ADMIN — Medication 3 MILLIGRAM(S): at 21:14

## 2021-01-25 RX ADMIN — APIXABAN 10 MILLIGRAM(S): 2.5 TABLET, FILM COATED ORAL at 18:51

## 2021-01-25 RX ADMIN — Medication 2: at 17:24

## 2021-01-25 RX ADMIN — REMDESIVIR 500 MILLIGRAM(S): 5 INJECTION INTRAVENOUS at 16:17

## 2021-01-25 RX ADMIN — LOSARTAN POTASSIUM 25 MILLIGRAM(S): 100 TABLET, FILM COATED ORAL at 05:22

## 2021-01-25 NOTE — PROGRESS NOTE ADULT - PROBLEM SELECTOR PLAN 1
Patient w/ hypoxia in the setting of COVID and PE.   - C/w supplemental O2 PRN  - Treatment for COVID and PE as below
cont. supportive care, contact/droplet precautions, steroids and remdesivir while inpatient, trend inflammatory markers, which are mostly down-trending
cont. supportive care, contact/droplet precautions, steroids and remdesivir while inpatient, trend inflammatory markers, which are down-trending

## 2021-01-25 NOTE — DISCHARGE NOTE PROVIDER - NSDCMRMEDTOKEN_GEN_ALL_CORE_FT
losartan 25 mg oral tablet: 1 tab(s) orally once a day  metoprolol tartrate 50 mg oral tablet: 1 tab(s) orally 2 times a day   apixaban 5 mg oral tablet: 2 tab(s) orally every 12 hours-5 more days (stop on 1/31/21)  then take  1 tablet orally every 12 hours-for next three months  please contact PCP for refills  dexamethasone 6 mg oral tablet: 1 tab(s) orally once a day   guaiFENesin 100 mg/5 mL oral liquid: 10 milliliter(s) orally every 6 hours, As needed, Cough  losartan 25 mg oral tablet: 1 tab(s) orally once a day  metoprolol tartrate 50 mg oral tablet: 1 tab(s) orally 2 times a day

## 2021-01-25 NOTE — PROGRESS NOTE ADULT - PROBLEM SELECTOR PROBLEM 3
Acute respiratory failure with hypoxia
Pulmonary embolism
Acute respiratory failure with hypoxia

## 2021-01-25 NOTE — DISCHARGE NOTE PROVIDER - HOSPITAL COURSE
82 y/o M with hx of recurrent renal stones, HTN, OA initially presented to [facility] w/ complaints of hypoxia found to be COVID positive on 1/15. He also underwent CTPE which was consistent w/ Pulmonary emboli in segmental arteries in all lobes. No evidence of right heart strain with Multifocal groundglass and consolidative opacities reflecting Covid 19 pneumonia. He received decadron and was started on hep gtt and transferred to [facility] for further management due to capacity issues at [facility]. At [facility], he was transitioned from Heparin gtt to Eliquis 10mg BID for ten days, to end on 2/2, and 5mg BID thereafter for 90 days. In regards to the COVID, he was started on 5 day course of Remdesivir to end on 1/26, and 10 day course of Decadron to end on 1/31. He was managed at [facility] on 3LNC, and was stable and saturating at 93%. He was ultimately deemed stable for discharge with home oxygen.     Problem List/Main Diagnoses (system-based):     Acute respiratory failure with hypoxia secondary to COVID and PE.   - C/w supplemental O2 PRN- to be sent home on home o2 as needed.  - Treatment for COVID and PE as below.     COVID-19.    - C/w decadron 6mg qd for 10 day duration (1/22-1/31)  - c/w remdesivir, last day 1/26    Pulmonary embolism.    - on apixaban 10mg BID until 2/2, and 5mg BID for 90 days thereafter  - follow up with Dr. Kellee PACHECO (hypertension).  Plan: Elevated BP on arrival.   - On losartan 25mg and metoprolol tartrate 50 BID     New medications:   Decadron 6mg daily until 1/31. Eliquis 10mg BID until 2/2, 5mg BID for three months after that.     Labs to be followed outpatient: None.    Exam to be followed outpatient: None.     80 y/o M with hx of recurrent renal stones, HTN, OA initially presented to [facility] w/ complaints of hypoxia found to be COVID positive on 1/15. He also underwent CTPE which was consistent w/ Pulmonary emboli in segmental arteries in all lobes. No evidence of right heart strain with Multifocal groundglass and consolidative opacities reflecting Covid 19 pneumonia. He received decadron and was started on hep gtt and transferred to [facility] for further management due to capacity issues at [facility]. At [facility], he was transitioned from Heparin gtt to Eliquis 10mg BID for ten days, to end on 2/2, and 5mg BID thereafter for 90 days. In regards to the COVID, he was started on 5 day course of Remdesivir to end on 1/26, and 10 day course of Decadron to end on 1/31. He was managed at [facility] on 3LNC, and was stable and saturating at 93%. He was ultimately deemed stable for discharge with home oxygen.     Problem List/Main Diagnoses (system-based):     Acute respiratory failure with hypoxia secondary to COVID and PE.   - C/w supplemental O2 PRN- to be sent home on home o2 as needed.  - Treatment for COVID and PE as below.     COVID-19.    - C/w decadron 6mg qd for total 10 day duration (1/22-1/31)  - finished 5 day course of remdesivir as inpatient on1/26    Pulmonary embolism.    - on apixaban 10mg BID until 2/2, and 5mg BID for 3-6 months thereafter (given one month supply here, will need further refills from PCP/pulm)  - follow up with Dr. Goodson    HTN (hypertension).  Plan: Elevated BP on arrival.   - On losartan 25mg and metoprolol tartrate 50 BID     New medications:   Decadron 6mg daily until 1/31. Eliquis 10mg BID until 2/2, 5mg BID for 3-6 months after that (refills to be managed by pulm/PCP)    Labs to be followed outpatient: None.    Exam to be followed outpatient: None.    ATTENDING ATTESTATION  Date of Service: 1/26/21    I interviewed and examined Billy Beltran on the day of discharge and greater than 30 minutes were spent by the team on processing their hospital discharge and coordinating their post-hospital care.     I discussed the care plan with the resident team. I agree with the discharge plan as outlined in the Discharge Summary. I have personally reviewed the above discharge summary and made changes where necessary, and as noted below.    81YOM with history of essential HTN, obesity (BMI 32), OA admitted for acute hypoxemic respiratory failure 2/2 COVID19 pneumonia and bilateral segmental PE. Improved s/p 5 days of remdesivir and dexamethasone, with near resolution of COVID symptoms though still hypoxemic. Stable for discharge home today with home O2 set up, and new discharge meds of dexamethasone (x5 more days to complete 10 days total) and Eliquis (at least 3-6 months). Per our COVID19 discharge protocol he was given pulse ox, PCP followup, Pulmonology tele-followup (on home O2), and vascular tele-followup (due to PE).     Discharge plan was discussed with his PCP, Dr. Juan Diego Snowden.    Physical exam on day of discharge:  General: pleasant, appropriate, no acute distress. Participating appropriately in interview.  HEENT: NC/AT, MMM  Neck: soft, supple, no lymphadenopathy  Cardiac: regular rhythm, normal rate, normal s1/s2, no murmurs, rubs, or gallops  Lungs: minimal scattered crackles diffusely, significantly improved from prior.  Normal work of breathing. Speaking in complete sentences. SpO2 mid 90s on 2-3L O2.   Abdomen: obese, soft, nontender, nondistended. Bowel sounds present and normoactive.   Extremities: moving all extremities. No edema.  Neuro: awake, alert, oriented x4. Follows commands. Moving all extremities. Sensation intact.  Psych: no evidence of AVH.    Eduardo Freitas MD  Attending Hospitalist

## 2021-01-25 NOTE — PROGRESS NOTE ADULT - SUBJECTIVE AND OBJECTIVE BOX
**INCOMPLETE NOTE    OVERNIGHT EVENTS:    SUBJECTIVE:  Patient seen and examined at bedside.    Vital Signs Last 12 Hrs  T(F): 97.4 (01-24-21 @ 05:56), Max: 98.3 (01-23-21 @ 21:30)  HR: 60 (01-24-21 @ 05:56) (60 - 68)  BP: 160/59 (01-24-21 @ 05:56) (151/88 - 160/59)  BP(mean): --  RR: 17 (01-24-21 @ 05:56) (17 - 18)  SpO2: 95% (01-24-21 @ 05:56) (92% - 95%)  I&O's Summary      PHYSICAL EXAM:  Constitutional: NAD, comfortable in bed.  HEENT: NC/AT, PERRLA, EOMI, no conjunctival pallor or scleral icterus, MMM  Neck: Supple, no JVD  Respiratory: CTA B/L. No w/r/r.   Cardiovascular: RRR, normal S1 and S2, no m/r/g.   Gastrointestinal: +BS, soft NTND, no guarding or rebound tenderness, no palpable masses   Extremities: wwp; no cyanosis, clubbing or edema.   Vascular: Pulses equal and strong throughout.   Neurological: AAOx3, no CN deficits, strength and sensation intact throughout.   Skin: No gross skin abnormalities or rashes        LABS:                        12.1   8.71  )-----------( 260      ( 23 Jan 2021 08:03 )             37.3     01-23    140  |  104  |  20  ----------------------------<  110<H>  4.2   |  23  |  1.08    Ca    9.0      23 Jan 2021 08:03  Phos  3.5     01-23  Mg     2.0     01-23    TPro  6.7  /  Alb  2.8<L>  /  TBili  0.3  /  DBili  x   /  AST  27  /  ALT  30  /  AlkPhos  52  01-23    PT/INR - ( 22 Jan 2021 15:48 )   PT: 17.3 sec;   INR: 1.47          PTT - ( 22 Jan 2021 15:48 )  PTT:143.1 sec        RADIOLOGY & ADDITIONAL TESTS:    MEDICATIONS  (STANDING):  dexAMETHasone  Injectable 6 milliGRAM(s) IV Push every 24 hours  dextrose 40% Gel 15 Gram(s) Oral once  dextrose 5%. 1000 milliLiter(s) (50 mL/Hr) IV Continuous <Continuous>  dextrose 5%. 1000 milliLiter(s) (100 mL/Hr) IV Continuous <Continuous>  dextrose 50% Injectable 25 Gram(s) IV Push once  dextrose 50% Injectable 12.5 Gram(s) IV Push once  dextrose 50% Injectable 25 Gram(s) IV Push once  enoxaparin Injectable 100 milliGRAM(s) SubCutaneous every 12 hours  glucagon  Injectable 1 milliGRAM(s) IntraMuscular once  insulin lispro (ADMELOG) corrective regimen sliding scale   SubCutaneous Before meals and at bedtime  losartan 25 milliGRAM(s) Oral daily  metoprolol tartrate 50 milliGRAM(s) Oral two times a day  remdesivir  IVPB   IV Intermittent   remdesivir  IVPB 100 milliGRAM(s) IV Intermittent every 24 hours    MEDICATIONS  (PRN):  
OVERNIGHT EVENTS: dev.     SUBJECTIVE / INTERVAL HPI: Patient seen and examined at bedside. he notes that he has been very tired and has been unable to sleep, but aside from this, feels well.    VITAL SIGNS:  Vital Signs Last 24 Hrs  T(C): 36.6 (25 Jan 2021 10:10), Max: 36.9 (24 Jan 2021 17:14)  T(F): 97.9 (25 Jan 2021 10:10), Max: 98.5 (24 Jan 2021 17:14)  HR: 77 (25 Jan 2021 09:21) (61 - 83)  BP: 149/75 (25 Jan 2021 09:21) (126/76 - 183/94)  BP(mean): --  RR: 20 (25 Jan 2021 09:21) (16 - 20)  SpO2: 93% (25 Jan 2021 09:21) (93% - 96%)    PHYSICAL EXAM:    Constitutional: NAD, comfortable in bed.  HEENT: NC/AT, PERRLA, EOMI, no conjunctival pallor or scleral icterus, MMM  Neck: Supple, no JVD  Respiratory: CTA B/L. No w/r/r.   Cardiovascular: RRR, normal S1 and S2, no m/r/g.   Gastrointestinal: +BS, soft NTND, no guarding or rebound tenderness, no palpable masses   Extremities: wwp; no cyanosis, clubbing or edema.   Vascular: Pulses equal and strong throughout.   Neurological: AAOx3, no CN deficits, strength and sensation intact throughout.   Skin: No gross skin abnormalities or rashes        MEDICATIONS:  MEDICATIONS  (STANDING):  apixaban 10 milliGRAM(s) Oral every 12 hours  dexAMETHasone  Injectable 6 milliGRAM(s) IV Push every 24 hours  dextrose 40% Gel 15 Gram(s) Oral once  dextrose 5%. 1000 milliLiter(s) (50 mL/Hr) IV Continuous <Continuous>  dextrose 5%. 1000 milliLiter(s) (100 mL/Hr) IV Continuous <Continuous>  dextrose 50% Injectable 25 Gram(s) IV Push once  dextrose 50% Injectable 12.5 Gram(s) IV Push once  dextrose 50% Injectable 25 Gram(s) IV Push once  glucagon  Injectable 1 milliGRAM(s) IntraMuscular once  insulin lispro (ADMELOG) corrective regimen sliding scale   SubCutaneous Before meals and at bedtime  losartan 25 milliGRAM(s) Oral daily  melatonin 3 milliGRAM(s) Oral at bedtime  metoprolol tartrate 50 milliGRAM(s) Oral two times a day  remdesivir  IVPB   IV Intermittent   remdesivir  IVPB 100 milliGRAM(s) IV Intermittent every 24 hours    MEDICATIONS  (PRN):  guaiFENesin   Syrup  (Sugar-Free) 200 milliGRAM(s) Oral every 6 hours PRN Cough      ALLERGIES:  Allergies    No Known Allergies    Intolerances        LABS:                        11.7   8.98  )-----------( 303      ( 25 Jan 2021 07:30 )             36.3     01-25    139  |  103  |  27<H>  ----------------------------<  100<H>  4.4   |  25  |  1.13    Ca    9.1      25 Jan 2021 07:30  Phos  3.9     01-25  Mg     1.9     01-25    TPro  6.5  /  Alb  3.1<L>  /  TBili  0.3  /  DBili  x   /  AST  37  /  ALT  42  /  AlkPhos  47  01-25    PT/INR - ( 24 Jan 2021 08:44 )   PT: 16.1 sec;   INR: 1.36          PTT - ( 24 Jan 2021 08:44 )  PTT:44.3 sec    CAPILLARY BLOOD GLUCOSE      POCT Blood Glucose.: 92 mg/dL (25 Jan 2021 07:20)      RADIOLOGY & ADDITIONAL TESTS: Reviewed.    ASSESSMENT:    PLAN: 
  Patient is a 81y old  Male who presents with a chief complaint of     INTERVAL HPI/OVERNIGHT EVENTS:    Pt. seen and examined earlier today  Pt. feels much better, reports less COLE, CP, and cough; improved energy, appetite, and sense of taste  No F/C    Review of Systems: 12 point review of systems otherwise negative    MEDICATIONS  (STANDING):  dexAMETHasone  Injectable 6 milliGRAM(s) IV Push every 24 hours  dextrose 40% Gel 15 Gram(s) Oral once  dextrose 5%. 1000 milliLiter(s) (50 mL/Hr) IV Continuous <Continuous>  dextrose 5%. 1000 milliLiter(s) (100 mL/Hr) IV Continuous <Continuous>  dextrose 50% Injectable 25 Gram(s) IV Push once  dextrose 50% Injectable 12.5 Gram(s) IV Push once  dextrose 50% Injectable 25 Gram(s) IV Push once  enoxaparin Injectable 100 milliGRAM(s) SubCutaneous every 12 hours  glucagon  Injectable 1 milliGRAM(s) IntraMuscular once  insulin lispro (ADMELOG) corrective regimen sliding scale   SubCutaneous Before meals and at bedtime  losartan 25 milliGRAM(s) Oral daily  metoprolol tartrate 50 milliGRAM(s) Oral two times a day  remdesivir  IVPB   IV Intermittent   remdesivir  IVPB 100 milliGRAM(s) IV Intermittent every 24 hours    MEDICATIONS  (PRN):      Allergies    No Known Allergies    Intolerances          Vital Signs Last 24 Hrs  T(C): 36.3 (23 Jan 2021 13:37), Max: 36.9 (23 Jan 2021 05:32)  T(F): 97.3 (23 Jan 2021 13:37), Max: 98.5 (23 Jan 2021 05:32)  HR: 70 (23 Jan 2021 09:52) (65 - 73)  BP: 172/80 (23 Jan 2021 09:52) (129/86 - 172/80)  BP(mean): 95 (22 Jan 2021 19:00) (95 - 95)  RR: 19 (23 Jan 2021 09:52) (18 - 19)  SpO2: 94% (23 Jan 2021 09:52) (93% - 96%)  CAPILLARY BLOOD GLUCOSE      POCT Blood Glucose.: 94 mg/dL (23 Jan 2021 11:20)  POCT Blood Glucose.: 99 mg/dL (23 Jan 2021 06:51)  POCT Blood Glucose.: 120 mg/dL (22 Jan 2021 21:59)        Physical Exam:  (earlier today)  Daily Height in cm: 172.72 (22 Jan 2021 17:40)    Daily   General: comfortable-appearing in NAD, sitting up in med  CV: no JVD  Lungs: normal WOB on 3L NC  Neuro:  AAOx3  [facility]    LABS:                        12.1   8.71  )-----------( 260      ( 23 Jan 2021 08:03 )             37.3     01-23    140  |  104  |  20  ----------------------------<  110<H>  4.2   |  23  |  1.08    Ca    9.0      23 Jan 2021 08:03  Phos  3.5     01-23  Mg     2.0     01-23    TPro  6.7  /  Alb  2.8<L>  /  TBili  0.3  /  DBili  x   /  AST  27  /  ALT  30  /  AlkPhos  52  01-23    PT/INR - ( 22 Jan 2021 15:48 )   PT: 17.3 sec;   INR: 1.47          PTT - ( 22 Jan 2021 15:48 )  PTT:143.1 sec    
  Patient is a 81y old  Male who presents with a chief complaint of     INTERVAL HPI/OVERNIGHT EVENTS:    Pt. seen and examined earlier today  Pt. c/o worsening cough O/N, but otherwise feels much better overall  Sense of taste has returned, energy improved, reports less COLE and CP  No F/C    Review of Systems: 12 point review of systems otherwise negative    MEDICATIONS  (STANDING):  dexAMETHasone  Injectable 6 milliGRAM(s) IV Push every 24 hours  dextrose 40% Gel 15 Gram(s) Oral once  dextrose 5%. 1000 milliLiter(s) (50 mL/Hr) IV Continuous <Continuous>  dextrose 5%. 1000 milliLiter(s) (100 mL/Hr) IV Continuous <Continuous>  dextrose 50% Injectable 25 Gram(s) IV Push once  dextrose 50% Injectable 12.5 Gram(s) IV Push once  dextrose 50% Injectable 25 Gram(s) IV Push once  enoxaparin Injectable 100 milliGRAM(s) SubCutaneous every 12 hours  glucagon  Injectable 1 milliGRAM(s) IntraMuscular once  insulin lispro (ADMELOG) corrective regimen sliding scale   SubCutaneous Before meals and at bedtime  losartan 25 milliGRAM(s) Oral daily  metoprolol tartrate 50 milliGRAM(s) Oral two times a day  remdesivir  IVPB   IV Intermittent   remdesivir  IVPB 100 milliGRAM(s) IV Intermittent every 24 hours    MEDICATIONS  (PRN):  guaiFENesin   Syrup  (Sugar-Free) 200 milliGRAM(s) Oral every 6 hours PRN Cough      Allergies    No Known Allergies    Intolerances          Vital Signs Last 24 Hrs  T(C): 36.3 (24 Jan 2021 05:56), Max: 36.8 (23 Jan 2021 21:30)  T(F): 97.4 (24 Jan 2021 05:56), Max: 98.3 (23 Jan 2021 21:30)  HR: 60 (24 Jan 2021 05:56) (60 - 94)  BP: 160/59 (24 Jan 2021 05:56) (151/88 - 160/59)  BP(mean): --  RR: 17 (24 Jan 2021 05:56) (17 - 19)  SpO2: 95% (24 Jan 2021 05:56) (92% - 95%)  CAPILLARY BLOOD GLUCOSE      POCT Blood Glucose.: 107 mg/dL (24 Jan 2021 06:41)  POCT Blood Glucose.: 135 mg/dL (23 Jan 2021 21:31)  POCT Blood Glucose.: 133 mg/dL (23 Jan 2021 16:23)        Physical Exam:  (earlier today)  Daily     Daily   General: comfortable-appearing in NAD, sitting up in bed  CV: no JVD  Lungs: normal WOB on 3L NC  Neuro:  AAOx3  rest of exam per housestaff    LABS:                        13.4   10.78 )-----------( 305      ( 24 Jan 2021 08:44 )             42.0     01-24    140  |  104  |  23  ----------------------------<  100<H>  4.4   |  23  |  1.07    Ca    9.9      24 Jan 2021 08:44  Phos  3.1     01-24  Mg     2.1     01-24    TPro  7.5  /  Alb  3.3  /  TBili  0.4  /  DBili  x   /  AST  30  /  ALT  36  /  AlkPhos  54  01-24    PT/INR - ( 24 Jan 2021 08:44 )   PT: 16.1 sec;   INR: 1.36          PTT - ( 24 Jan 2021 08:44 )  PTT:44.3 sec
OVERNIGHT EVENTS: patient admitted overnight    SUBJECTIVE / INTERVAL HPI: Patient seen and examined at bedside. No fevers/chills, nausea, vomiting, diarrhea, abdominal pain, chest pain, shortness of breath. States that he feels better compared to yesterday.      VITAL SIGNS:  Vital Signs Last 24 Hrs  T(C): 36.9 (23 Jan 2021 05:32), Max: 37 (22 Jan 2021 09:26)  T(F): 98.5 (23 Jan 2021 05:32), Max: 98.6 (22 Jan 2021 09:26)  HR: 69 (23 Jan 2021 05:32) (65 - 74)  BP: 158/92 (23 Jan 2021 05:32) (129/86 - 161/92)  BP(mean): 95 (22 Jan 2021 19:00) (95 - 95)  RR: 18 (23 Jan 2021 05:32) (18 - 23)  SpO2: 96% (23 Jan 2021 05:32) (90% - 99%)    PHYSICAL EXAM:    General: WDWN  HEENT: NC/AT; PERRL, anicteric sclera; MMM  Neck: supple  Cardiovascular: +S1/S2; RRR  Respiratory: CTA, slight crackles at bases bilaterally  Gastrointestinal: soft, NT/ND; +BSx4  Extremities: WWP; no edema, clubbing or cyanosis  Vascular: 2+ radial, DP pulses B/L  Neurological: AAOx3; no focal deficits    MEDICATIONS:  MEDICATIONS  (STANDING):  dexAMETHasone  Injectable 6 milliGRAM(s) IV Push every 24 hours  dextrose 40% Gel 15 Gram(s) Oral once  dextrose 5%. 1000 milliLiter(s) (50 mL/Hr) IV Continuous <Continuous>  dextrose 5%. 1000 milliLiter(s) (100 mL/Hr) IV Continuous <Continuous>  dextrose 50% Injectable 25 Gram(s) IV Push once  dextrose 50% Injectable 12.5 Gram(s) IV Push once  dextrose 50% Injectable 25 Gram(s) IV Push once  enoxaparin Injectable 100 milliGRAM(s) SubCutaneous every 12 hours  glucagon  Injectable 1 milliGRAM(s) IntraMuscular once  insulin lispro (ADMELOG) corrective regimen sliding scale   SubCutaneous Before meals and at bedtime  losartan 25 milliGRAM(s) Oral daily  metoprolol tartrate 50 milliGRAM(s) Oral two times a day  remdesivir  IVPB   IV Intermittent   remdesivir  IVPB 100 milliGRAM(s) IV Intermittent every 24 hours    MEDICATIONS  (PRN):      ALLERGIES:  Allergies    No Known Allergies    Intolerances        LABS:                        12.1   8.71  )-----------( 260      ( 23 Jan 2021 08:03 )             37.3     01-23    140  |  104  |  20  ----------------------------<  110<H>  4.2   |  23  |  1.08    Ca    9.0      23 Jan 2021 08:03  Phos  3.5     01-23  Mg     2.0     01-23    TPro  6.7  /  Alb  2.8<L>  /  TBili  0.3  /  DBili  x   /  AST  27  /  ALT  30  /  AlkPhos  52  01-23    PT/INR - ( 22 Jan 2021 15:48 )   PT: 17.3 sec;   INR: 1.47          PTT - ( 22 Jan 2021 15:48 )  PTT:143.1 sec    CAPILLARY BLOOD GLUCOSE      POCT Blood Glucose.: 99 mg/dL (23 Jan 2021 06:51)      RADIOLOGY & ADDITIONAL TESTS: Reviewed.

## 2021-01-25 NOTE — DISCHARGE NOTE PROVIDER - NSDCCPCAREPLAN_GEN_ALL_CORE_FT
PRINCIPAL DISCHARGE DIAGNOSIS  Diagnosis: COVID-19  Assessment and Plan of Treatment: COVID-19 is a disease caused by SARS-CoV-2 that can trigger what doctors call a respiratory tract infection. It can affect your upper respiratory tract (sinuses, nose, and throat) or lower respiratory tract (windpipe and lungs).  The virus can lead to pneumonia, respiratory failure, heart problems, liver problems, septic shock, and death. Many COVID-19 complications may be caused by a condition known as cytokine release syndrome or a cytokine storm. This is when an infection triggers your immune system to flood your bloodstream with inflammatory proteins called cytokines. They can kill tissue and damage your organs.  For your covid, we treated you with a steroid called Decadron, which you should continue to take until 1/31, and a medication called Remdesivir, which is an anti-viral medicaiton.        SECONDARY DISCHARGE DIAGNOSES  Diagnosis: Pulmonary embolism  Assessment and Plan of Treatment: Pulmonary embolism (or "PE") is a blockage in one or more of the blood vessels that supply blood to the lungs. Most often these blockages are caused by blood clots that form elsewhere and then travel to the lungs. In rare cases, blockages can also be caused by air bubbles, tiny globs of fat, or pieces of tumor that travel to the lungs. If a blood clot forms or gets stuck inside a blood vessel, it can clog the vessel and keep blood from getting where it needs to go. When that happens in the lungs, the lungs can get damaged. Having blocked arteries in the lung can also make it hard to breathe and can even lead to death. Common symptoms include panting, shortness of breath, or trouble breathing, sharp, knife-like chest pain when you breathe in or strain, coughing or coughing up blood or simply a rapid heartbeat.  For your PE, we started you on a blood thinner called Apixaban. You will be taking the 10 mg twice a day until 2/2. Then you will be taking 5mg twice a day for three months. Please follow up with Dr. Goodson for this and for the covid.

## 2021-01-25 NOTE — PROGRESS NOTE ADULT - PROBLEM SELECTOR PROBLEM 1
COVID-19
Acute respiratory failure with hypoxia
Acute respiratory failure with hypoxia
COVID-19
Acute respiratory failure with hypoxia

## 2021-01-25 NOTE — DIETITIAN INITIAL EVALUATION ADULT. - OTHER INFO
81M with hx of recurrent renal stones, HTN, OA presenting to [facility] w/ complaints of hypoxia admitted for COVID w/ hypoxia and PE. He wa first diagnosed with covid 1/15 and tested positive at , but did not get admitted. continued to feel unwell so came to [facility] on 1/22 where he had a positive pe on ct . Started hep drip and decadron and sent to [facility].    Unable to conduct in-person assessment d/t COVID-19 limited contact precautions. Attempted to reach pt via bed phone and personal phone # listed however no response. Obtaine dhx per conversation with listed emergency contact ([phone], pt's cousin). Per cousin, pt is typically able to go out and do his own food shopping. In addition, pt's cousin reports brining him food often. However, when pt got COVID, he was eating as well b/c he could not leave his house to get food. Per cousin, pt had decreased appetite for ~2 weeks PTA. He is unsure if pt had any weight changes in the last year; unsure of pt's UBW. Pt's cousin reports talking to pt daily stating that his appetite came back and he has been eating 100% of meals at the hospital. Despite reported good appetite and intake, nutrition services were consulted for kcal note.     Skin: WDL Pain: unable to assess GI: no noted n/v/d/c; no bms recorded in flowsheet- will cont to monitor. RD educated cousin on pt's current diet rx (DASH TLC) and he verbalized understanding. RD to follow. 81M with hx of recurrent renal stones, HTN, OA presenting to [facility] w/ complaints of hypoxia admitted for COVID w/ hypoxia and PE. He wa first diagnosed with covid 1/15 and tested positive at , but did not get admitted. continued to feel unwell so came to [facility] on 1/22 where he had a positive pe on ct . Started hep drip and decadron and sent to [facility].    Unable to conduct in-person assessment d/t COVID-19 limited contact precautions. Attempted to reach pt via bed phone and personal phone # listed however no response. Obtained hx per conversation with listed emergency contact ([phone], pt's cousin). Per cousin, pt is typically able to go out and do his own food shopping. In addition, pt's cousin reports brining him food often. However, when pt got COVID, he wasn't eating as well b/c he could not leave his house to get food. Per cousin, pt had decreased appetite for ~2 weeks PTA. He is unsure if pt had any weight changes in the last year; unsure of pt's UBW. Pt's cousin reports talking to pt daily while he's been in the hospital stating that pt reports his appetite came back and he has been eating 100% of meals at the hospital. Despite reported good appetite and intake, nutrition services was consulted for kcal count. RD paged team to discuss info obtained regarding pt's current appetite and intake. Skin: WDL Pain: unable to assess GI: no noted n/v/d/c; no bms recorded in flowsheet- will cont to monitor. RD educated cousin on pt's current diet rx (DASH TLC) and he verbalized understanding. RD to follow. 81M with hx of recurrent renal stones, HTN, OA presenting to [facility] w/ complaints of hypoxia admitted for COVID w/ hypoxia and PE. He wa first diagnosed with covid 1/15 and tested positive at , but did not get admitted. continued to feel unwell so came to [facility] on 1/22 where he had a positive pe on ct . Started hep drip and decadron and sent to [facility].    Unable to conduct in-person assessment d/t COVID-19 limited contact precautions. Attempted to reach pt via bed phone and personal phone # listed however no response. Obtained hx per conversation with listed emergency contact ([phone], pt's cousin). Per cousin, pt is typically able to go out and do his own food shopping. In addition, pt's cousin reports brining him food often. However, when pt got COVID, he wasn't eating as well b/c he could not leave his house to get food. Per cousin, pt had decreased appetite for ~2 weeks PTA. He is unsure if pt had any weight changes in the last year; unsure of pt's UBW. Pt's cousin reports talking to pt daily while he's been in the hospital stating that pt reports his appetite came back and he has been eating 100% of meals at the hospital. Despite reported good appetite and intake in the hospital, nutrition services was consulted for kcal count. RD spoke with resident regarding kcal count however she was unaware of kcal count being ordered- kcal count sheets were brought up to RN on floor, RD will cont to follow up. Skin: WDL Pain: unable to assess GI: no noted n/v/d/c; no bms recorded in flowsheet- will cont to monitor. RD educated cousin on pt's current diet rx (DASH TLC) and he verbalized understanding. RD to follow.

## 2021-01-25 NOTE — DIETITIAN INITIAL EVALUATION ADULT. - OTHER CALCULATIONS
IBW used as pt exceeds 120% IBW (137%). Needs based on [facility] standards of care for older adults adjusted for increased pro/ kcal needs r/t hypermetabolic state 2/2 COVID. Fluids per team,

## 2021-01-25 NOTE — PROGRESS NOTE ADULT - ASSESSMENT
82 y/o M with hx of recurrent renal stones, HTN, OA presenting to [facility] w/ complaints of hypoxia admitted for COVID w/ hypoxia and PE
82 y/o M with hx of recurrent renal stones, HTN, OA presenting to [facility] w/ complaints of hypoxia admitted for COVID w/ hypoxia and PE
82 y/o M with hx of recurrent renal stones, HTN, OA presenting to [facility] w/ complaints of hypoxia admitted for COVID w/ hypoxia and PE
82 y/o M w/
82 y/o M w/

## 2021-01-25 NOTE — DISCHARGE NOTE PROVIDER - NSDCFUSCHEDAPPT_GEN_ALL_CORE_FT
LIDIA GRIFFITH ; 02/08/2021 ; [facility] LIDIA GRIFFITH ; 02/04/2021 ; [facility] [address] [place] [address]  LIDIA GRIFFITH ; 02/08/2021 ; [facility] [address] [place] [address]

## 2021-01-25 NOTE — PROGRESS NOTE ADULT - PROBLEM SELECTOR PLAN 5
Pt w/ recurrent kidney stones w/ several hospitalizations (most recent 2 weeks ago). No active sxs. Just completed outpt abx  - NTD; monitor for sxs
Pt w/ recurrent kidney stones w/ several hospitalizations (most recent 2 weeks ago). No active sxs. Just completed outpt abx  - NTD; monitor for sxs
HbA1c 5.7%; trial of lifestyle modifications, repeat HbA1c in 3 months
HbA1c 5.7%; trial of lifestyle modifications, repeat HbA1c in 3 months
Pt w/ recurrent kidney stones w/ several hospitalizations (most recent 2 weeks ago). No active sxs. Just completed outpt abx  - NTD; monitor for sxs

## 2021-01-25 NOTE — PROGRESS NOTE ADULT - ATTENDING COMMENTS
Dispo: home pending progress  likely needs home O2
Dispo: home pending progress  assess need for home O2
Patient discussed with resident team and plan of care reviewed. I have personally reviewed all pertinent labs and imaging and performed an independent history and physical. Resident note personally reviewed, and I agree with above resident note with the following additions:    81YOM with history of essential HTN, OA admitted for acute hypoxemic respiratory failure 2/2 COVID19 pneumonia and bilateral segmental PE.    Symptoms improved significantly with dexamethasone/remdesivir and DOAC. Doing well on 3L O2. Will continue to attempt weaning O2 but will likely need home O2. Last dose of remdesivir is tomorrow afternoon, likely will be able to discharge home with home O2 tomorrow after 5th dose of remdesivir.

## 2021-01-25 NOTE — PROGRESS NOTE ADULT - PROBLEM SELECTOR PROBLEM 2
Acute pulmonary embolism without acute cor pulmonale, unspecified pulmonary embolism type
COVID-19
COVID-19
Acute pulmonary embolism without acute cor pulmonale, unspecified pulmonary embolism type
COVID-19

## 2021-01-25 NOTE — PROGRESS NOTE ADULT - PROBLEM SELECTOR PLAN 6
F: tolerating PO, no IVF  E: replete K<4, Mg<2  N: Dash/TLC    VTE Prophylaxis: Lovenox SubQ  C: Full Code  D: RMF

## 2021-01-25 NOTE — PROGRESS NOTE ADULT - PROBLEM SELECTOR PLAN 4
cont. ARB, beta-blocker, DASH diet
cont. ARB, beta-blocker, DASH diet
Elevated BP on arrival. On losartan 25mg and metoprolol tartrate 50 BID   - C/w home meds

## 2021-01-25 NOTE — PROGRESS NOTE ADULT - PROBLEM SELECTOR PROBLEM 4
Essential hypertension
Essential hypertension
HTN (hypertension)

## 2021-01-25 NOTE — PROGRESS NOTE ADULT - PROBLEM SELECTOR PLAN 2
likely provoked, d/t COVID-19; PESI class III or so, mostly d/t age; cont. LMWH, can likely transition to DOAC x3-6 months; will need outpatient Pulm f/u; can hold off on TTE
likely provoked, d/t COVID-19; PESI class III or so, mostly d/t age; cont. LMWH, can likely transition to DOAC x3-6 months; will need outpatient Pulm f/u; can hold off on TTE
Patient w/ covid first diagnosed on 1/15 (sxs started on 1/13). W/ worsening sxs and hypoxia  - C/w decadron 6mg qd for 10 day duration (1/22-1/31)  - c/w remdesivir, last day 1/27  - F/u daily labs

## 2021-01-25 NOTE — DISCHARGE NOTE PROVIDER - NSDCFUADDAPPT_GEN_ALL_CORE_FT
Juan Diego Snowden- KAREN  Feb 17th, 10:30 AM     Juan Diego Snowden- PCP  Feb 17th, 10:30 AM    Please follow up with your Pulmonary Medicine Provider, Dr. Ashley Goodson on 02/08/2021 at 10:00am via Telehealth.  Please note that this appointment will take place over the phone.  The office of Dr. Goodson will contact you from  [phone].    Appointment was scheduled by Ms. BRITTA Tobin, Referral Coordinator.     Juan Diego Snowden- PCP  Feb 17th, 10:30 AM    Please follow up with your PCP, Dr. Snowden, on Feb 17th at 10:30 AM.     Please follow up with your Pulmonary Medicine Provider, Dr. Ashley Goodson on 02/08/2021 at 10:00am via Telehealth.  Please note that this appointment will take place over the phone.  The office of Dr. Goodson will contact you from  [phone].    Appointment was scheduled by Ms. BRITTA Tobin, Referral Coordinator.     Please follow up with your PCP, Dr. Snowden, on Feb 17th at 10:30 AM.     Please follow up with your Vascular Surgery Provider, Dr. Ileana Otto on 02/04/2021 at 2:00pm via Telehealth.   Please note that this appointment will take place either over the phone or video.   Please call the office of Dr. Otto at [phone] prior appointment to share your preference in using a phone or video in order that you can receive further instructions.    Appointment was scheduled by Ms. BRITTA Tobin, Referral Coordinator.    Please follow up with your Pulmonary Medicine Provider, Dr. Ashley Goodson on 02/08/2021 at 10:00am via Telehealth.  Please note that this appointment will take place over the phone.  The office of Dr. Goodson will contact you from  [phone].    Appointment was scheduled by Ms. BRITTA Tobin, Referral Coordinator.

## 2021-01-25 NOTE — DISCHARGE NOTE PROVIDER - PROVIDER TOKENS
PROVIDER:[TOKEN:[[id number]],FOLLOWUP:[2 weeks]] FREE:[LAST:[Kellee],FIRST:[Ashley GOMEZ],PHONE:[phone]],FAX:[phone]],ADDRESS:[PULMONARY MEDICINE],SCHEDULEDAPPT:[02/08/2021],SCHEDULEDAPPTTIME:[10:00 AM]] FREE:[LAST:[Kellee],FIRST:[Ashley GOMEZ],PHONE:[phone]],FAX:[phone]],ADDRESS:[PULMONARY MEDICINE],SCHEDULEDAPPT:[02/08/2021],SCHEDULEDAPPTTIME:[10:00 AM]],PROVIDER:[TOKEN:[[id number]],FOLLOWUP:[2 weeks]] FREE:[LAST:[Kellee],FIRST:[Ashley GOMEZ],PHONE:[phone]],FAX:[phone]],ADDRESS:[PULMONARY MEDICINE],SCHEDULEDAPPT:[02/08/2021],SCHEDULEDAPPTTIME:[10:00 AM]],FREE:[LAST:[Clarita],FIRST:[Ileana],PHONE:[phone]],FAX:[phone]],ADDRESS:[VASCULAR SURGERY],SCHEDULEDAPPT:[02/04/2021],SCHEDULEDAPPTTIME:[02:00 PM]]

## 2021-01-25 NOTE — DISCHARGE NOTE PROVIDER - CARE PROVIDER_API CALL
Ashley Goodson)  Internal Medicine; Pulmonary Disease  [address] [place] [address]  Phone: [phone]  Fax: [phone]  Follow Up Time: 2 weeks   Ashley Goodson  PULMONARY MEDICINE  Phone: [phone]  Fax: [phone]  Scheduled Appointment: 02/08/2021 10:00 AM   Ashley Goodson  PULMONARY MEDICINE  Phone: [phone]  Fax: [phone]  Scheduled Appointment: 02/08/2021 10:00 AM    Ileana Otto)  [facility] Surgery  Vascular  [address] [place] [address]  Phone: [phone]  Fax: [phone]  Follow Up Time: 2 weeks   Ashley Goodson  PULMONARY MEDICINE  Phone: [phone]  Fax: [phone]  Scheduled Appointment: 02/08/2021 10:00 AM    Ileana Otto  VASCULAR SURGERY  Phone: [phone]  Fax: [phone]  Scheduled Appointment: 02/04/2021 02:00 PM

## 2021-01-25 NOTE — DIETITIAN INITIAL EVALUATION ADULT. - ADD RECOMMEND
1. Cont DASH TLC diet>> if intake falls, consider liberalizing to low sodium >> monitor need for addition of ONS 2. Cont to monitor POCT q6hrs while pt ordered for steroid. 3. Monitor weights, labs, and GI symptoms

## 2021-01-25 NOTE — DISCHARGE NOTE PROVIDER - CARE PROVIDERS DIRECT ADDRESSES
,DirectAddress_Unknown ,DirectAddress_Unknown,[email or web address] ,DirectAddress_Unknown,DirectAddress_Unknown

## 2021-01-26 ENCOUNTER — TRANSCRIPTION ENCOUNTER (OUTPATIENT)
Age: 81
End: 2021-01-26

## 2021-01-26 VITALS
SYSTOLIC BLOOD PRESSURE: 152 MMHG | TEMPERATURE: 98 F | OXYGEN SATURATION: 92 % | DIASTOLIC BLOOD PRESSURE: 75 MMHG | RESPIRATION RATE: 18 BRPM | HEART RATE: 71 BPM

## 2021-01-26 LAB
ANION GAP SERPL CALC-SCNC: 14 MMOL/L — SIGNIFICANT CHANGE UP (ref 5–17)
BUN SERPL-MCNC: 28 MG/DL — HIGH (ref 7–23)
CALCIUM SERPL-MCNC: 9.2 MG/DL — SIGNIFICANT CHANGE UP (ref 8.4–10.5)
CHLORIDE SERPL-SCNC: 104 MMOL/L — SIGNIFICANT CHANGE UP (ref 96–108)
CO2 SERPL-SCNC: 23 MMOL/L — SIGNIFICANT CHANGE UP (ref 22–31)
CREAT SERPL-MCNC: 1.06 MG/DL — SIGNIFICANT CHANGE UP (ref 0.5–1.3)
GLUCOSE BLDC GLUCOMTR-MCNC: 110 MG/DL — HIGH (ref 70–99)
GLUCOSE BLDC GLUCOMTR-MCNC: 91 MG/DL — SIGNIFICANT CHANGE UP (ref 70–99)
GLUCOSE SERPL-MCNC: 151 MG/DL — HIGH (ref 70–99)
HCT VFR BLD CALC: 39.9 % — SIGNIFICANT CHANGE UP (ref 39–50)
HGB BLD-MCNC: 12.6 G/DL — LOW (ref 13–17)
MAGNESIUM SERPL-MCNC: 1.8 MG/DL — SIGNIFICANT CHANGE UP (ref 1.6–2.6)
MCHC RBC-ENTMCNC: 29.2 PG — SIGNIFICANT CHANGE UP (ref 27–34)
MCHC RBC-ENTMCNC: 31.6 GM/DL — LOW (ref 32–36)
MCV RBC AUTO: 92.4 FL — SIGNIFICANT CHANGE UP (ref 80–100)
NRBC # BLD: 0 /100 WBCS — SIGNIFICANT CHANGE UP (ref 0–0)
PLATELET # BLD AUTO: 322 K/UL — SIGNIFICANT CHANGE UP (ref 150–400)
POTASSIUM SERPL-MCNC: 4.1 MMOL/L — SIGNIFICANT CHANGE UP (ref 3.5–5.3)
POTASSIUM SERPL-SCNC: 4.1 MMOL/L — SIGNIFICANT CHANGE UP (ref 3.5–5.3)
RBC # BLD: 4.32 M/UL — SIGNIFICANT CHANGE UP (ref 4.2–5.8)
RBC # FLD: 12.4 % — SIGNIFICANT CHANGE UP (ref 10.3–14.5)
SODIUM SERPL-SCNC: 141 MMOL/L — SIGNIFICANT CHANGE UP (ref 135–145)
WBC # BLD: 10.54 K/UL — HIGH (ref 3.8–10.5)
WBC # FLD AUTO: 10.54 K/UL — HIGH (ref 3.8–10.5)

## 2021-01-26 PROCEDURE — 85379 FIBRIN DEGRADATION QUANT: CPT

## 2021-01-26 PROCEDURE — 99239 HOSP IP/OBS DSCHRG MGMT >30: CPT

## 2021-01-26 PROCEDURE — 85730 THROMBOPLASTIN TIME PARTIAL: CPT

## 2021-01-26 PROCEDURE — 80053 COMPREHEN METABOLIC PANEL: CPT

## 2021-01-26 PROCEDURE — 85610 PROTHROMBIN TIME: CPT

## 2021-01-26 PROCEDURE — 82728 ASSAY OF FERRITIN: CPT

## 2021-01-26 PROCEDURE — 83036 HEMOGLOBIN GLYCOSYLATED A1C: CPT

## 2021-01-26 PROCEDURE — 83880 ASSAY OF NATRIURETIC PEPTIDE: CPT

## 2021-01-26 PROCEDURE — 84484 ASSAY OF TROPONIN QUANT: CPT

## 2021-01-26 PROCEDURE — 85027 COMPLETE CBC AUTOMATED: CPT

## 2021-01-26 PROCEDURE — 86140 C-REACTIVE PROTEIN: CPT

## 2021-01-26 PROCEDURE — 82962 GLUCOSE BLOOD TEST: CPT

## 2021-01-26 PROCEDURE — 80048 BASIC METABOLIC PNL TOTAL CA: CPT

## 2021-01-26 PROCEDURE — 84100 ASSAY OF PHOSPHORUS: CPT

## 2021-01-26 PROCEDURE — 83735 ASSAY OF MAGNESIUM: CPT

## 2021-01-26 PROCEDURE — 85025 COMPLETE CBC W/AUTO DIFF WBC: CPT

## 2021-01-26 PROCEDURE — 36415 COLL VENOUS BLD VENIPUNCTURE: CPT

## 2021-01-26 RX ORDER — DEXAMETHASONE 0.5 MG/5ML
1 ELIXIR ORAL
Qty: 5 | Refills: 0
Start: 2021-01-26 | End: 2021-01-30

## 2021-01-26 RX ORDER — MAGNESIUM SULFATE 500 MG/ML
2 VIAL (ML) INJECTION ONCE
Refills: 0 | Status: COMPLETED | OUTPATIENT
Start: 2021-01-26 | End: 2021-01-26

## 2021-01-26 RX ORDER — APIXABAN 2.5 MG/1
2 TABLET, FILM COATED ORAL
Qty: 120 | Refills: 0
Start: 2021-01-26 | End: 2021-02-24

## 2021-01-26 RX ADMIN — Medication 50 GRAM(S): at 10:23

## 2021-01-26 RX ADMIN — APIXABAN 10 MILLIGRAM(S): 2.5 TABLET, FILM COATED ORAL at 05:55

## 2021-01-26 RX ADMIN — REMDESIVIR 500 MILLIGRAM(S): 5 INJECTION INTRAVENOUS at 13:01

## 2021-01-26 RX ADMIN — Medication 50 MILLIGRAM(S): at 05:55

## 2021-01-26 RX ADMIN — LOSARTAN POTASSIUM 25 MILLIGRAM(S): 100 TABLET, FILM COATED ORAL at 05:55

## 2021-01-26 RX ADMIN — Medication 6 MILLIGRAM(S): at 10:23

## 2021-01-26 NOTE — DISCHARGE NOTE NURSING/CASE MANAGEMENT/SOCIAL WORK - PATIENT PORTAL LINK FT
Please contact patient to set up imagining prior to appointment on 9/23/20. Patient aware that scheduling will be contacting them.        Spoke with patient, patient states that pain in mid left back started on Saturday, 9/19/20.Patient described it as a tight feeling (baseline 3-4/10), and that it started radiating to the front of her abdomen. Patient states that pain gets worse when she takes deep breaths ( 8-9/10) . Patient states that she went to Chiropractor this morning, 9/22/20, and Chiropractor stated that 2 ribs were were dislocated. Patient states that Chiropractor put ribs back into place this morning at appointment.  Patient states she only has difficulty breathing/SOB when she is sitting/lying down.     Per PCP recommendations, appointment made for 9/23/20 at 10:00 for Rib pain/difficult breathing.    Orders Placed This Encounter   • XR Ribs Min 4 View W PA Chest Bilateral   • XR Chest PA and Lateral        You can access the FollowMyHealth Patient Portal offered by [facility] by registering at the following website: [email or web address]. By joining [employer]’s FollowMyHealth portal, you will also be able to view your health information using other applications (apps) compatible with our system.

## 2021-01-26 NOTE — DISCHARGE NOTE NURSING/CASE MANAGEMENT/SOCIAL WORK - NSDCFUADDAPPT_GEN_ALL_CORE_FT
Please follow up with your PCP, Dr. Snowden, on Feb 17th at 10:30 AM.     Please follow up with your Vascular Surgery Provider, Dr. Ileana Otto on 02/04/2021 at 2:00pm via Telehealth.   Please note that this appointment will take place either over the phone or video.   Please call the office of Dr. Otto at [phone] prior appointment to share your preference in using a phone or video in order that you can receive further instructions.    Appointment was scheduled by Ms. BRITTA Tobin, Referral Coordinator.    Please follow up with your Pulmonary Medicine Provider, Dr. Ashley Goodson on 02/08/2021 at 10:00am via Telehealth.  Please note that this appointment will take place over the phone.  The office of Dr. Goodson will contact you from  [phone].    Appointment was scheduled by Ms. BRITTA Tobin, Referral Coordinator.

## 2021-02-01 DIAGNOSIS — U07.1 COVID-19: ICD-10-CM

## 2021-02-01 DIAGNOSIS — R73.03 PREDIABETES: ICD-10-CM

## 2021-02-01 DIAGNOSIS — I10 ESSENTIAL (PRIMARY) HYPERTENSION: ICD-10-CM

## 2021-02-01 DIAGNOSIS — E66.9 OBESITY, UNSPECIFIED: ICD-10-CM

## 2021-02-01 DIAGNOSIS — M19.90 UNSPECIFIED OSTEOARTHRITIS, UNSPECIFIED SITE: ICD-10-CM

## 2021-02-01 DIAGNOSIS — R09.02 HYPOXEMIA: ICD-10-CM

## 2021-02-01 DIAGNOSIS — J96.01 ACUTE RESPIRATORY FAILURE WITH HYPOXIA: ICD-10-CM

## 2021-02-01 DIAGNOSIS — J12.82 PNEUMONIA DUE TO CORONAVIRUS DISEASE 2019: ICD-10-CM

## 2021-02-01 DIAGNOSIS — I26.99 OTHER PULMONARY EMBOLISM WITHOUT ACUTE COR PULMONALE: ICD-10-CM

## 2021-02-04 ENCOUNTER — APPOINTMENT (OUTPATIENT)
Dept: VASCULAR SURGERY | Facility: CLINIC | Age: 81
End: 2021-02-04
Payer: MEDICARE

## 2021-02-04 PROCEDURE — 99446 NTRPROF PH1/NTRNET/EHR 5-10: CPT

## 2021-02-08 ENCOUNTER — APPOINTMENT (OUTPATIENT)
Dept: PULMONOLOGY | Facility: CLINIC | Age: 81
End: 2021-02-08

## 2021-02-10 NOTE — PROGRESS NOTE ADULT - PROBLEM/PLAN-5
INPATIENT PROGRESS NOTES    PATIENT NAME: Remy Atwood  MRN: [id number]  SERVICE DATE:  February 9, 2021   SERVICE TIME:  9:42 PM      PRIMARY SERVICE: Pulmonary Disease    CHIEF COMPLAIN: Lung mass      INTERVAL HPI: Patient seen and examined at bedside, Interval Notes, orders reviewed. Nursing notes noted  Patient denies having shortness of breath. No chest pain. No fever. No nausea, vomiting or diarrhea. Patient says she does not want to have a biopsy of lung mass. Bone scan was negative    OBJECTIVE    Body mass index is 24.02 kg/m². PHYSICAL EXAM:  Vitals:  BP (!) 105/54   Pulse 91   Temp 97.5 °F (36.4 °C) (Oral)   Resp 18   Ht 5' 3\" (1.6 m)   Wt 135 lb 9.3 oz (61.5 kg)   SpO2 97%   BMI 24.02 kg/m²   General: Alert, awake . comfortable in bed, No distress. Head: Atraumatic , Normocephalic   Eyes: PERRL. No sclera icterus. No conjunctival injection. No discharge   ENT: No nasal  discharge. Pharynx clear. lips, teeth, mucosa and gums are normal, tongue protrudes in the midline  Neck:  Trachea midline. No thyromegaly, no JVD, No cervical adenopathy. Chest : Bilaterally symmetrical ,Normal effort,  No accessory muscle use  Lung : . Fair BS bilateral, decreased BS at bases. No Rales. No wheezing. No rhonchi. Heart[de-identified] Normal  rate. Regular rhythm. No mumur ,  Rub or gallop  ABD: Non-tender. Non-distended. No masses. No organmegaly. Normal bowel sounds. No hernia.   Ext : No Pitting both leg , No Cyanosis No clubbing  Neuro: no focal weakness          DATA:   Recent Labs     02/08/21  0515 02/09/21  0546   WBC 12.0* 17.2*   HGB 9.9* 9.4*   HCT 31.8* 30.2*   .0* 101.0*    299     Recent Labs     02/08/21  0515 02/09/21  0546   * 133*   K 4.5 4.0   CL 96 97   CO2 26 28   BUN 10 7*   CREATININE 3.62* 2.02*   GLUCOSE 101* 71   CALCIUM 8.1* 7.8*   PROT 4.9* 5.1*   LABALBU 1.5* 1.8*   BILITOT 0.6 0.5   ALKPHOS 231* 214*   AST 28 25   ALT 22 20   LABGLOM 12.3* 24.1*
GFRAA 14.9* 29.2*   GLOB 3.4 3.3       MV Settings:          No results for input(s): PHART, LMZ1MDJ, PO2ART, PTD3OXN, BEART, K5MMHQCR in the last 72 hours.     O2 Device: None (Room air)  O2 Flow Rate (L/min): 0 L/min    DIET GENERAL; No Added Salt (3-4 GM)  Dietary Nutrition Supplements: Frozen Oral Supplement     MEDICATIONS during current hospitalization:    Continuous Infusions:   lactated ringers Stopped (02/05/21 2021)       Scheduled Meds:   lidocaine  3 patch Transdermal Daily    sodium chloride flush  10 mL Intravenous 2 times per day    heparin (porcine)  5,000 Units Subcutaneous 3 times per day    heparin (porcine)  2,000 Units Intravenous Once    pantoprazole  40 mg Oral QAM AC       PRN Meds:metaxalone, sodium chloride flush, promethazine **OR** ondansetron, polyethylene glycol, acetaminophen **OR** [DISCONTINUED] acetaminophen, traMADol    Radiology  Ct Abdomen Pelvis Wo Contrast Additional Contrast? None    Result Date: 2/6/2021
CT of the Abdomen and Pelvis without intravenous contrast medium History:  Abnormal CT Technical Factors: CT imaging of the abdomen and pelvis were obtained and formatted as 5 mm contiguous axial images from the domes of the diaphragm to the symphysis pubis. Sagittal and coronal reconstructions were also obtained. Oral contrast medium: Water as oral contrast medium. Intravenous contrast medium: None. Comparison: Findings: Lungs:  Bilateral pleural effusions and consolidation/atelectasis are seen in both bases. Liver:  Normal in size, shape, and attenuation. Bile Ducts:  Normal in caliber. Gallbladder: Distended with stones layering in the gallbladder. Pancreas: Homogeneous parenchymal enhancement. No necrosis identified. No intrahepatic fluid collections, cystic or solid lesions, pancreatic ductal dilatation, or calcification. Spleen:  Normal in size without masses or calcifications. No splenules. Kidneys:  Small with cortical thinning. There is no evidence for hydronephrosis or renal calculus Adrenals:  Normal. Small bowel:  Normal in caliber. Appendix:  Normal. Colon: Diverticulosis coli without evidence for diverticulitis. Peritoneum:  Small to moderate amount of free fluid in the posterior pelvis. Surgical clips are seen in the upper abdomen. Vessels: At the level of the renal veins the abdominal aorta measures 3.5 cm in greatest transverse diameter and 3 cm anterior posterior. Below the renal veins the abdominal aorta measures 5.5 transverse by 5.6 cm AP. Right above the bifurcation the aneurysm measures 3.9 transverse by 3.8 cm. The aneurysm measures 13 cm craniocaudad. There are diffuse atherosclerotic calcifications visualized. Lymph nodes: No retroperitoneal lymph node enlargement. Bladder: Distended normally Abdominal Wall: Edematous change is in the subcutaneous fat over the flanks. A tiny umbilical hernia. Bones: No destructive bony lesions are seen.
1. Large abdominal aortic aneurysm is seen as described. 2. No evidence for bowel obstruction, diverticulitis or appendicitis. There is evidence of diverticulosis coli. Also free fluid is seen in the posterior pelvis 3. Cholelithiasis 4. Small to moderate bilateral pleural effusions and bilateral infiltrates versus atelectasis All CT scans at this facility use dose modulation, iterative reconstruction, and/or weight based dosing when appropriate to reduce radiation dose to as low as reasonably achievable. 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 FINDINGS: Four views of the left shoulder are submitted. There is diffuse generalized osteopenia. No acute fractures. No dislocations. No focal bony abnormalities                                                                                   NO ACUTE FRACTURES CONSIDER WHOLE BODY BONE SCAN TO FURTHER EVALUATE EXAMINATION: RIGHT HIP  CLINICAL HISTORY: Pain. COMPARISON: None  FINDINGS: Two views are  submitted. No acute fractures. No dislocations. No focal bony abnormalities                                                                               IMPRESSION: NO ACUTE FRACTURE.  CONSIDER WHOLE BODY BONE SCAN TO FURTHER EVALUATE    Ct Head Wo Contrast    Result Date: 2/2/2021
CT HEAD WO CONTRAST : 2/2/2021 CLINICAL HISTORY:  weakness . COMPARISON: None available. TECHNIQUE: Spiral unenhanced images were obtained of the head, with routine multiplanar reconstructions performed. All CT scans at this facility use dose modulation, iterative reconstruction, and/or weight based dosing when appropriate to reduce radiation dose to as low as reasonably achievable. FINDINGS: Destructive lytic lesions with surrounding soft tissue density are present within the posterior right parietal bone, at the vertex, the left lateral orbital wall, and to a lesser extent elsewhere, which are suspicious for metastatic disease. The largest within the right posterior parietal calvarium measures approximately 2.5 cm. There is no intracranial hemorrhage, mass effect, midline shift, extra-axial collection, evidence of hydrocephalus, recent ischemic infarct, or displaced pathologic fracture identified. Mild generalized cerebral volume loss is present, with mild patchy supratentorial white matter changes most consistent with chronic small vessel ischemic disease. The mastoid air cells and visualized paranasal sinuses are essentially clear. SUSPICIOUS LYTIC CALVARIAL LESIONS FOR METASTATIC DISEASE, AS NOTED. OTHER POSSIBILITIES ARE THOUGHT TO BE LESS LIKELY. FURTHER EVALUATION IS SUGGESTED. NO ACUTE INTRACRANIAL HEMORRHAGE OR COMPLICATION IDENTIFIED.      Ct Chest Wo Contrast    Result Date: 2/5/2021
CT of the Chest without intravenous contrast medium. History:  Lung nodule. Follow-up. Nodule identified, in 2018. 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 End-stage renal disease. Receiving hemodialysis. Technical Factors: CT imaging of the chest was obtained and formatted as 5 mm contiguous axial images from the thoracic inlet through the adrenal glands. Sagittal and coronal reconstruction obtained during postprocessing. Intravenous contrast medium:  None Comparison:  None Findings: On the right side, a noncalcified nonpleural-based, partially stellate shaped 2.2 x 2.1 x 2.4 cm mass is visualized within the right upper lobe (series 2, image 23, series 601, image 25, series 602, image 21). Strand-like extension from the mass extending to the pleural surface. Dependent subsegmental atelectatic change versus fluid, dependent portion right middle lobe at level of major fissure. Small to moderate right pleural effusion. Masslike consolidation extending from right infrahilar region, caudally into the right lower lobe, with calcification identified. 6 mm calcification identified within right lower lobe. On the left side, small to moderate left pleural effusion with consolidation, left lower lobe. No nodules and no masses. Cardiac size normal. No pericardial effusion. Thoracic aorta normal in course and caliber. Multilumen jugular vein central line extending into right ventricle. Limited imaging upper abdomen shows 2 radiopacities measuring up to 2.7 mm just anterior to distal greater curvature (series 2, image 59). No osteoblastic, and no osteolytic lesions.
2.2 x 2.1 x 2.4 cm right upper lobe mass. Malignancy diagnosis of exclusion. Bilateral small to moderate pleural effusions with bilateral lower lobe atelectasis/pneumonia. Other findings discussed. All CT scans at this facility use dose modulation, iterative reconstruction, and/or weight based dosing when appropriate to reduce radiation dose to as low as reasonably achievable. 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 FINDINGS: Four views of the left shoulder are submitted. There is diffuse generalized osteopenia. No acute fractures. No dislocations. No focal bony abnormalities                                                                                   NO ACUTE FRACTURES CONSIDER WHOLE BODY BONE SCAN TO FURTHER EVALUATE EXAMINATION: RIGHT HIP  CLINICAL HISTORY: Pain. COMPARISON: None  FINDINGS: Two views are  submitted. No acute fractures. No dislocations. No focal bony abnormalities                                                                               IMPRESSION: NO ACUTE FRACTURE. CONSIDER WHOLE BODY BONE SCAN TO FURTHER EVALUATE    Xr Chest Portable    Result Date: 2/2/2021  Exam: XR CHEST PORTABLE History:  shoulder pain weakness Technique: AP portable view of the chest obtained. Comparison: None Chest x-ray portable Findings: There is a right internal jugular dialysis catheter in place . The cardiomediastinal silhouette is within normal limits. There are no infiltrates, consolidations or effusions. There is mild blunting of the left costophrenic recesses which may represent a small pleural effusion. Bones of the thorax appear intact. No acute cardiopulmonary changes. Mild blunting of the costophrenic recess on the left may be due to a small effusion versus prior scarring.        IMPRESSION AND SUGGESTION:
DISPLAY PLAN FREE TEXT

## 2021-02-22 NOTE — HISTORY OF PRESENT ILLNESS
[FreeTextEntry1] : Verbal consent for telehealth services was obtained from the patient. Visit provided using telephioc services\par \par The location of the patient: Home\par The location of the Provider: [address] [place] [address]\par \par Persons participating in the telehealth service and their role in the encounter:\par Patient: Billy Beltran\par Physician: Dr. Otto\par Time spent: 15 minutes \par \par 82 y/o M with hx of recurrent renal stones, HTN, OA initially presented to [facility] w/ complaints of hypoxia found to be COVID positive on 1/15. He also underwent CTPE which was consistent w/ Pulmonary emboli in segmental arteries in all lobes. No evidence of right heart strain with Multifocal ground glass and consolidative opacities reflecting COVID 19 pneumonia. He was discharged on Eliquis which he has been taking regularly. He denies any increase in SOB, new chest pain, new leg pain or swelling. \par

## 2021-02-22 NOTE — ASSESSMENT
[FreeTextEntry1] : 82 y/o M with hx of recurrent renal stones, HTN, OA initially presented to [facility] w/ complaints of hypoxia found to be COVID positive on 1/15. He also underwent CTPE which was consistent w/ Pulmonary emboli in segmental arteries in all lobes. No evidence of right heart strain with Multifocal ground glass and consolidative opacities reflecting COVID 19 pneumonia. He was discharged on Eliquis which he has been taking regularly. No new symptoms. Will continue Eliquis for three months for provoked PE after COVID, will see us back in three months or sooner if needed. \par

## 2021-07-31 NOTE — ED ADULT NURSE REASSESSMENT NOTE - INV PAIN INTERVENTIONS-NUMBER SCALE
multiple medication modalities
multiple medication modalities
single medication modality
no LOC/no nausea/no vomiting

## 2022-10-10 NOTE — DISCHARGE NOTE PROVIDER - NSDCQMSTAIRS_GEN_ALL_CORE
Payam Longoria (MD)  Cardiovascular Disease; Internal Medicine  [address] [place] [address]  Phone: [phone]  Fax: [phone]  Follow Up Time: 1 week    Yany Naranjo (DO)  Internal Medicine  [address] [place] [address]  Phone: [phone]  Fax: [phone]  Follow Up Time: 1 week    Your primary care physician,   Phone: (   )    -  Fax: (   )    -  Follow Up Time: 1 week    general neurology,   [address] [place] [address]  Phone: [phone]  Fax: (   )    -  Follow Up Time: 1 week  
No

## 2025-04-07 NOTE — ED PROVIDER NOTE - NSCAREINITIATED _GEN_ER
Juan Diego Hay(Attending)
Consent: The patient's consent was obtained including but not limited to risks of crusting, scabbing, blistering, scarring, darker or lighter pigmentary change, recurrence, incomplete removal and infection.
Post-Care Instructions: I reviewed with the patient in detail both written and verbal post care instructions. Avoid swimming until the treated area heals. patient verbalizes understanding. Written instructions provided. Written instructions provided written instructions provided
Number Of Freeze-Thaw Cycles: 1 freeze-thaw cycle
Show Applicator Variable?: Yes
Application Tool (Optional): Liquid Nitrogen Sprayer
Detail Level: Detailed
Duration Of Freeze Thaw-Cycle (Seconds): 8